# Patient Record
Sex: MALE | Race: WHITE | NOT HISPANIC OR LATINO | Employment: FULL TIME | URBAN - METROPOLITAN AREA
[De-identification: names, ages, dates, MRNs, and addresses within clinical notes are randomized per-mention and may not be internally consistent; named-entity substitution may affect disease eponyms.]

---

## 2019-03-29 ENCOUNTER — OFFICE VISIT CONVERTED (OUTPATIENT)
Dept: SURGERY | Facility: CLINIC | Age: 44
End: 2019-03-29
Attending: UROLOGY

## 2021-05-15 VITALS — BODY MASS INDEX: 25.91 KG/M2 | WEIGHT: 181 LBS | RESPIRATION RATE: 16 BRPM | HEIGHT: 70 IN

## 2023-03-21 ENCOUNTER — HOSPITAL ENCOUNTER (INPATIENT)
Facility: HOSPITAL | Age: 48
LOS: 1 days | Discharge: HOME | DRG: 247 | End: 2023-03-23
Attending: EMERGENCY MEDICINE | Admitting: INTERNAL MEDICINE
Payer: COMMERCIAL

## 2023-03-21 ENCOUNTER — APPOINTMENT (OUTPATIENT)
Dept: GENERAL RADIOLOGY | Facility: HOSPITAL | Age: 48
DRG: 247 | End: 2023-03-21
Payer: COMMERCIAL

## 2023-03-21 DIAGNOSIS — I21.4 NSTEMI (NON-ST ELEVATED MYOCARDIAL INFARCTION): Primary | ICD-10-CM

## 2023-03-21 LAB
ALBUMIN SERPL-MCNC: 4.2 G/DL (ref 3.5–5.2)
ALBUMIN/GLOB SERPL: 2 G/DL
ALP SERPL-CCNC: 48 U/L (ref 39–117)
ALT SERPL W P-5'-P-CCNC: 24 U/L (ref 1–41)
ANION GAP SERPL CALCULATED.3IONS-SCNC: 11 MMOL/L (ref 5–15)
APTT PPP: 26.5 SECONDS (ref 78–95.9)
AST SERPL-CCNC: 22 U/L (ref 1–40)
BASOPHILS # BLD AUTO: 0.01 10*3/MM3 (ref 0–0.2)
BASOPHILS # BLD AUTO: 0.03 10*3/MM3 (ref 0–0.2)
BASOPHILS NFR BLD AUTO: 0.1 % (ref 0–1.5)
BASOPHILS NFR BLD AUTO: 0.3 % (ref 0–1.5)
BILIRUB SERPL-MCNC: 0.3 MG/DL (ref 0–1.2)
BUN SERPL-MCNC: 25 MG/DL (ref 6–20)
BUN/CREAT SERPL: 26.9 (ref 7–25)
CALCIUM SPEC-SCNC: 9.1 MG/DL (ref 8.6–10.5)
CHLORIDE SERPL-SCNC: 101 MMOL/L (ref 98–107)
CO2 SERPL-SCNC: 23 MMOL/L (ref 22–29)
CREAT SERPL-MCNC: 0.93 MG/DL (ref 0.76–1.27)
DEPRECATED RDW RBC AUTO: 40.9 FL (ref 37–54)
DEPRECATED RDW RBC AUTO: 41.5 FL (ref 37–54)
EGFRCR SERPLBLD CKD-EPI 2021: 102.6 ML/MIN/1.73
EOSINOPHIL # BLD AUTO: 0 10*3/MM3 (ref 0–0.4)
EOSINOPHIL # BLD AUTO: 0.01 10*3/MM3 (ref 0–0.4)
EOSINOPHIL NFR BLD AUTO: 0 % (ref 0.3–6.2)
EOSINOPHIL NFR BLD AUTO: 0.1 % (ref 0.3–6.2)
ERYTHROCYTE [DISTWIDTH] IN BLOOD BY AUTOMATED COUNT: 12.4 % (ref 12.3–15.4)
ERYTHROCYTE [DISTWIDTH] IN BLOOD BY AUTOMATED COUNT: 12.6 % (ref 12.3–15.4)
GEN 5 2HR TROPONIN T REFLEX: 130 NG/L
GLOBULIN UR ELPH-MCNC: 2.1 GM/DL
GLUCOSE SERPL-MCNC: 124 MG/DL (ref 65–99)
HCT VFR BLD AUTO: 40.4 % (ref 37.5–51)
HCT VFR BLD AUTO: 40.8 % (ref 37.5–51)
HGB BLD-MCNC: 13.8 G/DL (ref 13–17.7)
HGB BLD-MCNC: 14 G/DL (ref 13–17.7)
HOLD SPECIMEN: NORMAL
HOLD SPECIMEN: NORMAL
IMM GRANULOCYTES # BLD AUTO: 0.03 10*3/MM3 (ref 0–0.05)
IMM GRANULOCYTES # BLD AUTO: 0.03 10*3/MM3 (ref 0–0.05)
IMM GRANULOCYTES NFR BLD AUTO: 0.3 % (ref 0–0.5)
IMM GRANULOCYTES NFR BLD AUTO: 0.3 % (ref 0–0.5)
INR PPP: 1.04 (ref 0.86–1.15)
LIPASE SERPL-CCNC: 28 U/L (ref 13–60)
LYMPHOCYTES # BLD AUTO: 1.27 10*3/MM3 (ref 0.7–3.1)
LYMPHOCYTES # BLD AUTO: 1.28 10*3/MM3 (ref 0.7–3.1)
LYMPHOCYTES NFR BLD AUTO: 12 % (ref 19.6–45.3)
LYMPHOCYTES NFR BLD AUTO: 14.4 % (ref 19.6–45.3)
MAGNESIUM SERPL-MCNC: 1.8 MG/DL (ref 1.6–2.6)
MCH RBC QN AUTO: 30.7 PG (ref 26.6–33)
MCH RBC QN AUTO: 30.8 PG (ref 26.6–33)
MCHC RBC AUTO-ENTMCNC: 34.2 G/DL (ref 31.5–35.7)
MCHC RBC AUTO-ENTMCNC: 34.3 G/DL (ref 31.5–35.7)
MCV RBC AUTO: 89.5 FL (ref 79–97)
MCV RBC AUTO: 90.2 FL (ref 79–97)
MONOCYTES # BLD AUTO: 0.32 10*3/MM3 (ref 0.1–0.9)
MONOCYTES # BLD AUTO: 0.46 10*3/MM3 (ref 0.1–0.9)
MONOCYTES NFR BLD AUTO: 3.6 % (ref 5–12)
MONOCYTES NFR BLD AUTO: 4.4 % (ref 5–12)
NEUTROPHILS NFR BLD AUTO: 7.24 10*3/MM3 (ref 1.7–7)
NEUTROPHILS NFR BLD AUTO: 8.78 10*3/MM3 (ref 1.7–7)
NEUTROPHILS NFR BLD AUTO: 81.5 % (ref 42.7–76)
NEUTROPHILS NFR BLD AUTO: 83 % (ref 42.7–76)
NRBC BLD AUTO-RTO: 0 /100 WBC (ref 0–0.2)
NRBC BLD AUTO-RTO: 0 /100 WBC (ref 0–0.2)
NT-PROBNP SERPL-MCNC: 45.7 PG/ML (ref 0–450)
PLATELET # BLD AUTO: 225 10*3/MM3 (ref 140–450)
PLATELET # BLD AUTO: 239 10*3/MM3 (ref 140–450)
PMV BLD AUTO: 9.4 FL (ref 6–12)
PMV BLD AUTO: 9.5 FL (ref 6–12)
POTASSIUM SERPL-SCNC: 4.5 MMOL/L (ref 3.5–5.2)
PROT SERPL-MCNC: 6.3 G/DL (ref 6–8.5)
PROTHROMBIN TIME: 13.7 SECONDS (ref 11.8–14.9)
RBC # BLD AUTO: 4.48 10*6/MM3 (ref 4.14–5.8)
RBC # BLD AUTO: 4.56 10*6/MM3 (ref 4.14–5.8)
SODIUM SERPL-SCNC: 135 MMOL/L (ref 136–145)
TROPONIN T DELTA: 36 NG/L
TROPONIN T SERPL HS-MCNC: 94 NG/L
WBC NRBC COR # BLD: 10.57 10*3/MM3 (ref 3.4–10.8)
WBC NRBC COR # BLD: 8.89 10*3/MM3 (ref 3.4–10.8)
WHOLE BLOOD HOLD COAG: NORMAL
WHOLE BLOOD HOLD SPECIMEN: NORMAL

## 2023-03-21 PROCEDURE — 99223 1ST HOSP IP/OBS HIGH 75: CPT | Performed by: STUDENT IN AN ORGANIZED HEALTH CARE EDUCATION/TRAINING PROGRAM

## 2023-03-21 PROCEDURE — 93005 ELECTROCARDIOGRAM TRACING: CPT | Performed by: EMERGENCY MEDICINE

## 2023-03-21 PROCEDURE — 85025 COMPLETE CBC W/AUTO DIFF WBC: CPT | Performed by: EMERGENCY MEDICINE

## 2023-03-21 PROCEDURE — 99285 EMERGENCY DEPT VISIT HI MDM: CPT

## 2023-03-21 PROCEDURE — 83880 ASSAY OF NATRIURETIC PEPTIDE: CPT

## 2023-03-21 PROCEDURE — 71045 X-RAY EXAM CHEST 1 VIEW: CPT

## 2023-03-21 PROCEDURE — 83735 ASSAY OF MAGNESIUM: CPT

## 2023-03-21 PROCEDURE — 85730 THROMBOPLASTIN TIME PARTIAL: CPT | Performed by: EMERGENCY MEDICINE

## 2023-03-21 PROCEDURE — 25010000002 HEPARIN (PORCINE) PER 1000 UNITS: Performed by: EMERGENCY MEDICINE

## 2023-03-21 PROCEDURE — 85610 PROTHROMBIN TIME: CPT | Performed by: EMERGENCY MEDICINE

## 2023-03-21 PROCEDURE — 80053 COMPREHEN METABOLIC PANEL: CPT

## 2023-03-21 PROCEDURE — 93005 ELECTROCARDIOGRAM TRACING: CPT

## 2023-03-21 PROCEDURE — 85025 COMPLETE CBC W/AUTO DIFF WBC: CPT

## 2023-03-21 PROCEDURE — 25010000002 HEPARIN (PORCINE) PER 1000 UNITS: Performed by: INTERNAL MEDICINE

## 2023-03-21 PROCEDURE — 84484 ASSAY OF TROPONIN QUANT: CPT

## 2023-03-21 PROCEDURE — 83690 ASSAY OF LIPASE: CPT

## 2023-03-21 RX ORDER — ASPIRIN 81 MG/1
324 TABLET, CHEWABLE ORAL ONCE
Status: DISCONTINUED | OUTPATIENT
Start: 2023-03-21 | End: 2023-03-23

## 2023-03-21 RX ORDER — HEPARIN SOD,PORCINE/0.9 % NACL 25000/250
12 INTRAVENOUS SOLUTION INTRAVENOUS
Status: DISCONTINUED | OUTPATIENT
Start: 2023-03-21 | End: 2023-03-23 | Stop reason: HOSPADM

## 2023-03-21 RX ORDER — SODIUM CHLORIDE 0.9 % (FLUSH) 0.9 %
10 SYRINGE (ML) INJECTION AS NEEDED
Status: DISCONTINUED | OUTPATIENT
Start: 2023-03-21 | End: 2023-03-23 | Stop reason: HOSPADM

## 2023-03-21 RX ORDER — LIDOCAINE HYDROCHLORIDE 20 MG/ML
15 SOLUTION OROPHARYNGEAL ONCE
Status: DISCONTINUED | OUTPATIENT
Start: 2023-03-21 | End: 2023-03-21

## 2023-03-21 RX ORDER — ALUMINA, MAGNESIA, AND SIMETHICONE 2400; 2400; 240 MG/30ML; MG/30ML; MG/30ML
15 SUSPENSION ORAL ONCE
Status: DISCONTINUED | OUTPATIENT
Start: 2023-03-21 | End: 2023-03-21

## 2023-03-21 RX ADMIN — HEPARIN SODIUM 12 UNITS/KG/HR: 5000 INJECTION INTRAVENOUS; SUBCUTANEOUS at 22:44

## 2023-03-21 RX ADMIN — NITROGLYCERIN 0.5 INCH: 20 OINTMENT TOPICAL at 23:27

## 2023-03-22 ENCOUNTER — APPOINTMENT (OUTPATIENT)
Dept: CARDIOLOGY | Facility: HOSPITAL | Age: 48
DRG: 247 | End: 2023-03-22
Payer: COMMERCIAL

## 2023-03-22 PROBLEM — Z51.81 THERAPEUTIC DRUG MONITORING: Status: ACTIVE | Noted: 2023-03-22

## 2023-03-22 PROBLEM — R00.1 SINUS BRADYCARDIA: Status: ACTIVE | Noted: 2023-03-22

## 2023-03-22 PROBLEM — Z98.890 STATUS POST LEFT HEART CATHETERIZATION: Status: ACTIVE | Noted: 2023-03-22

## 2023-03-22 PROBLEM — I21.4 NSTEMI (NON-ST ELEVATED MYOCARDIAL INFARCTION): Status: RESOLVED | Noted: 2023-03-21 | Resolved: 2023-03-22

## 2023-03-22 LAB
ACT BLD: 215 SECONDS (ref 89–137)
ACT BLD: 275 SECONDS (ref 89–137)
APTT PPP: 46.6 SECONDS (ref 78–95.9)
APTT PPP: 71.7 SECONDS (ref 78–95.9)
BH CV ECHO MEAS - AO ROOT DIAM: 3.1 CM
BH CV ECHO MEAS - EDV(MOD-SP2): 59 ML
BH CV ECHO MEAS - EDV(MOD-SP4): 58 ML
BH CV ECHO MEAS - EF(MOD-BP): 62 %
BH CV ECHO MEAS - EF(MOD-SP2): 62 %
BH CV ECHO MEAS - EF(MOD-SP4): 61 %
BH CV ECHO MEAS - ESV(MOD-SP2): 23 ML
BH CV ECHO MEAS - ESV(MOD-SP4): 22 ML
BH CV ECHO MEAS - IVSD: 0.9 CM
BH CV ECHO MEAS - LA DIMENSION: 2.9 CM
BH CV ECHO MEAS - LAT PEAK E' VEL: 5.6 CM/SEC
BH CV ECHO MEAS - LVIDD: 3.9 CM
BH CV ECHO MEAS - LVIDS: 2.6 CM
BH CV ECHO MEAS - LVOT DIAM: 1.8 CM
BH CV ECHO MEAS - LVPWD: 1.1 CM
BH CV ECHO MEAS - MED PEAK E' VEL: 5.7 CM/SEC
BH CV ECHO MEAS - MV A MAX VEL: 75 CM/SEC
BH CV ECHO MEAS - MV DEC TIME: 90 MSEC
BH CV ECHO MEAS - MV E MAX VEL: 90 CM/SEC
BH CV ECHO MEAS - MV E/A: 1.2
BH CV ECHO MEAS - RAP SYSTOLE: 23 MMHG
BH CV ECHO MEAS - RVDD: 2.9 CM
BH CV ECHO MEAS - RVSP: 3 MMHG
BH CV ECHO MEAS - TR MAX PG: 20 MMHG
BH CV ECHO MEAS - TR MAX VEL: 222 CM/SEC
BH CV ECHO MEASUREMENTS AVERAGE E/E' RATIO: 15.93
CHOLEST SERPL-MCNC: 228 MG/DL (ref 0–200)
HBA1C MFR BLD: 5.1 % (ref 4.8–5.6)
HDLC SERPL-MCNC: 49 MG/DL (ref 40–60)
HOLD SPECIMEN: NORMAL
IVRT: 92 MSEC
LDLC SERPL CALC-MCNC: 163 MG/DL (ref 0–100)
LDLC/HDLC SERPL: 3.29 {RATIO}
LEFT ATRIUM VOLUME INDEX: 12 ML/M2
LEFT ATRIUM VOLUME: 23.1 ML
MAXIMAL PREDICTED HEART RATE: 174 BPM
STRESS TARGET HR: 148 BPM
TRIGL SERPL-MCNC: 88 MG/DL (ref 0–150)
VLDLC SERPL-MCNC: 16 MG/DL (ref 5–40)
WHOLE BLOOD HOLD SPECIMEN: NORMAL

## 2023-03-22 PROCEDURE — C9600 PERC DRUG-EL COR STENT SING: HCPCS | Performed by: INTERNAL MEDICINE

## 2023-03-22 PROCEDURE — 94799 UNLISTED PULMONARY SVC/PX: CPT

## 2023-03-22 PROCEDURE — C1725 CATH, TRANSLUMIN NON-LASER: HCPCS | Performed by: INTERNAL MEDICINE

## 2023-03-22 PROCEDURE — 25010000002 HEPARIN (PORCINE) PER 1000 UNITS: Performed by: INTERNAL MEDICINE

## 2023-03-22 PROCEDURE — 83036 HEMOGLOBIN GLYCOSYLATED A1C: CPT | Performed by: INTERNAL MEDICINE

## 2023-03-22 PROCEDURE — C1887 CATHETER, GUIDING: HCPCS | Performed by: INTERNAL MEDICINE

## 2023-03-22 PROCEDURE — C1874 STENT, COATED/COV W/DEL SYS: HCPCS | Performed by: INTERNAL MEDICINE

## 2023-03-22 PROCEDURE — 25510000001 IOPAMIDOL PER 1 ML: Performed by: INTERNAL MEDICINE

## 2023-03-22 PROCEDURE — 25010000002 FENTANYL CITRATE (PF) 50 MCG/ML SOLUTION: Performed by: INTERNAL MEDICINE

## 2023-03-22 PROCEDURE — 93005 ELECTROCARDIOGRAM TRACING: CPT | Performed by: INTERNAL MEDICINE

## 2023-03-22 PROCEDURE — 99152 MOD SED SAME PHYS/QHP 5/>YRS: CPT | Performed by: INTERNAL MEDICINE

## 2023-03-22 PROCEDURE — 93306 TTE W/DOPPLER COMPLETE: CPT

## 2023-03-22 PROCEDURE — 92978 ENDOLUMINL IVUS OCT C 1ST: CPT | Performed by: INTERNAL MEDICINE

## 2023-03-22 PROCEDURE — 027034Z DILATION OF CORONARY ARTERY, ONE ARTERY WITH DRUG-ELUTING INTRALUMINAL DEVICE, PERCUTANEOUS APPROACH: ICD-10-PCS | Performed by: INTERNAL MEDICINE

## 2023-03-22 PROCEDURE — 80061 LIPID PANEL: CPT | Performed by: INTERNAL MEDICINE

## 2023-03-22 PROCEDURE — B240ZZ3 ULTRASONOGRAPHY OF SINGLE CORONARY ARTERY, INTRAVASCULAR: ICD-10-PCS | Performed by: INTERNAL MEDICINE

## 2023-03-22 PROCEDURE — 93005 ELECTROCARDIOGRAM TRACING: CPT | Performed by: STUDENT IN AN ORGANIZED HEALTH CARE EDUCATION/TRAINING PROGRAM

## 2023-03-22 PROCEDURE — 99233 SBSQ HOSP IP/OBS HIGH 50: CPT | Performed by: INTERNAL MEDICINE

## 2023-03-22 PROCEDURE — 93458 L HRT ARTERY/VENTRICLE ANGIO: CPT | Performed by: INTERNAL MEDICINE

## 2023-03-22 PROCEDURE — 99153 MOD SED SAME PHYS/QHP EA: CPT | Performed by: INTERNAL MEDICINE

## 2023-03-22 PROCEDURE — 93306 TTE W/DOPPLER COMPLETE: CPT | Performed by: INTERNAL MEDICINE

## 2023-03-22 PROCEDURE — 85730 THROMBOPLASTIN TIME PARTIAL: CPT | Performed by: STUDENT IN AN ORGANIZED HEALTH CARE EDUCATION/TRAINING PROGRAM

## 2023-03-22 PROCEDURE — 4A023N7 MEASUREMENT OF CARDIAC SAMPLING AND PRESSURE, LEFT HEART, PERCUTANEOUS APPROACH: ICD-10-PCS | Performed by: INTERNAL MEDICINE

## 2023-03-22 PROCEDURE — C1894 INTRO/SHEATH, NON-LASER: HCPCS | Performed by: INTERNAL MEDICINE

## 2023-03-22 PROCEDURE — C1769 GUIDE WIRE: HCPCS | Performed by: INTERNAL MEDICINE

## 2023-03-22 PROCEDURE — 25010000002 HEPARIN (PORCINE) PER 1000 UNITS: Performed by: STUDENT IN AN ORGANIZED HEALTH CARE EDUCATION/TRAINING PROGRAM

## 2023-03-22 PROCEDURE — C1753 CATH, INTRAVAS ULTRASOUND: HCPCS | Performed by: INTERNAL MEDICINE

## 2023-03-22 PROCEDURE — 99222 1ST HOSP IP/OBS MODERATE 55: CPT | Performed by: INTERNAL MEDICINE

## 2023-03-22 PROCEDURE — B2151ZZ FLUOROSCOPY OF LEFT HEART USING LOW OSMOLAR CONTRAST: ICD-10-PCS | Performed by: INTERNAL MEDICINE

## 2023-03-22 PROCEDURE — 25010000002 MIDAZOLAM PER 1 MG: Performed by: INTERNAL MEDICINE

## 2023-03-22 PROCEDURE — B2111ZZ FLUOROSCOPY OF MULTIPLE CORONARY ARTERIES USING LOW OSMOLAR CONTRAST: ICD-10-PCS | Performed by: INTERNAL MEDICINE

## 2023-03-22 PROCEDURE — 85347 COAGULATION TIME ACTIVATED: CPT

## 2023-03-22 PROCEDURE — 92928 PRQ TCAT PLMT NTRAC ST 1 LES: CPT | Performed by: INTERNAL MEDICINE

## 2023-03-22 DEVICE — XIENCE SKYPOINT™ EVEROLIMUS ELUTING CORONARY STENT SYSTEM 3.00 MM X 38 MM / RAPID-EXCHANGE
Type: IMPLANTABLE DEVICE | Status: FUNCTIONAL
Brand: XIENCE SKYPOINT™

## 2023-03-22 RX ORDER — ONDANSETRON 2 MG/ML
4 INJECTION INTRAMUSCULAR; INTRAVENOUS EVERY 6 HOURS PRN
Status: DISCONTINUED | OUTPATIENT
Start: 2023-03-22 | End: 2023-03-23 | Stop reason: HOSPADM

## 2023-03-22 RX ORDER — VERAPAMIL HYDROCHLORIDE 2.5 MG/ML
INJECTION, SOLUTION INTRAVENOUS
Status: DISCONTINUED | OUTPATIENT
Start: 2023-03-22 | End: 2023-03-22 | Stop reason: HOSPADM

## 2023-03-22 RX ORDER — FENTANYL CITRATE 50 UG/ML
INJECTION, SOLUTION INTRAMUSCULAR; INTRAVENOUS
Status: DISCONTINUED | OUTPATIENT
Start: 2023-03-22 | End: 2023-03-22 | Stop reason: HOSPADM

## 2023-03-22 RX ORDER — SODIUM CHLORIDE 9 MG/ML
75 INJECTION, SOLUTION INTRAVENOUS CONTINUOUS
Status: DISCONTINUED | OUTPATIENT
Start: 2023-03-22 | End: 2023-03-22

## 2023-03-22 RX ORDER — SODIUM CHLORIDE 0.9 % (FLUSH) 0.9 %
10 SYRINGE (ML) INJECTION EVERY 12 HOURS SCHEDULED
Status: DISCONTINUED | OUTPATIENT
Start: 2023-03-22 | End: 2023-03-23 | Stop reason: HOSPADM

## 2023-03-22 RX ORDER — SODIUM CHLORIDE 9 MG/ML
40 INJECTION, SOLUTION INTRAVENOUS AS NEEDED
Status: DISCONTINUED | OUTPATIENT
Start: 2023-03-22 | End: 2023-03-23 | Stop reason: HOSPADM

## 2023-03-22 RX ORDER — SODIUM CHLORIDE 0.9 % (FLUSH) 0.9 %
10 SYRINGE (ML) INJECTION AS NEEDED
Status: DISCONTINUED | OUTPATIENT
Start: 2023-03-22 | End: 2023-03-23 | Stop reason: HOSPADM

## 2023-03-22 RX ORDER — SODIUM CHLORIDE, SODIUM LACTATE, POTASSIUM CHLORIDE, CALCIUM CHLORIDE 600; 310; 30; 20 MG/100ML; MG/100ML; MG/100ML; MG/100ML
75 INJECTION, SOLUTION INTRAVENOUS CONTINUOUS
Status: ACTIVE | OUTPATIENT
Start: 2023-03-22 | End: 2023-03-23

## 2023-03-22 RX ORDER — ASPIRIN 81 MG/1
TABLET, CHEWABLE ORAL
Status: DISCONTINUED | OUTPATIENT
Start: 2023-03-22 | End: 2023-03-22 | Stop reason: HOSPADM

## 2023-03-22 RX ORDER — DIAPER,BRIEF,INFANT-TODD,DISP
1 EACH MISCELLANEOUS ONCE
Status: COMPLETED | OUTPATIENT
Start: 2023-03-22 | End: 2023-03-22

## 2023-03-22 RX ORDER — HEPARIN SODIUM 1000 [USP'U]/ML
INJECTION, SOLUTION INTRAVENOUS; SUBCUTANEOUS
Status: DISCONTINUED | OUTPATIENT
Start: 2023-03-22 | End: 2023-03-22 | Stop reason: HOSPADM

## 2023-03-22 RX ORDER — ONDANSETRON 4 MG/1
4 TABLET, FILM COATED ORAL EVERY 6 HOURS PRN
Status: DISCONTINUED | OUTPATIENT
Start: 2023-03-22 | End: 2023-03-23 | Stop reason: HOSPADM

## 2023-03-22 RX ORDER — ASPIRIN 81 MG/1
81 TABLET, CHEWABLE ORAL DAILY
Status: DISCONTINUED | OUTPATIENT
Start: 2023-03-23 | End: 2023-03-23 | Stop reason: HOSPADM

## 2023-03-22 RX ORDER — ASPIRIN 81 MG/1
81 TABLET ORAL DAILY
Status: DISCONTINUED | OUTPATIENT
Start: 2023-03-22 | End: 2023-03-22

## 2023-03-22 RX ORDER — ROSUVASTATIN CALCIUM 20 MG/1
20 TABLET, COATED ORAL NIGHTLY
Status: DISCONTINUED | OUTPATIENT
Start: 2023-03-22 | End: 2023-03-23 | Stop reason: HOSPADM

## 2023-03-22 RX ORDER — ACETAMINOPHEN 325 MG/1
650 TABLET ORAL EVERY 4 HOURS PRN
Status: DISCONTINUED | OUTPATIENT
Start: 2023-03-22 | End: 2023-03-23 | Stop reason: HOSPADM

## 2023-03-22 RX ORDER — MIDAZOLAM HYDROCHLORIDE 1 MG/ML
INJECTION INTRAMUSCULAR; INTRAVENOUS
Status: DISCONTINUED | OUTPATIENT
Start: 2023-03-22 | End: 2023-03-22 | Stop reason: HOSPADM

## 2023-03-22 RX ORDER — SODIUM CHLORIDE 9 MG/ML
100 INJECTION, SOLUTION INTRAVENOUS CONTINUOUS
Status: DISCONTINUED | OUTPATIENT
Start: 2023-03-22 | End: 2023-03-22

## 2023-03-22 RX ORDER — LIDOCAINE HYDROCHLORIDE 20 MG/ML
INJECTION, SOLUTION INFILTRATION; PERINEURAL
Status: DISCONTINUED | OUTPATIENT
Start: 2023-03-22 | End: 2023-03-22 | Stop reason: HOSPADM

## 2023-03-22 RX ORDER — NITROGLYCERIN 0.4 MG/1
0.4 TABLET SUBLINGUAL
Status: DISCONTINUED | OUTPATIENT
Start: 2023-03-22 | End: 2023-03-23 | Stop reason: HOSPADM

## 2023-03-22 RX ORDER — ACETAMINOPHEN 325 MG/1
650 TABLET ORAL EVERY 4 HOURS PRN
Status: DISCONTINUED | OUTPATIENT
Start: 2023-03-22 | End: 2023-03-22 | Stop reason: SDUPTHER

## 2023-03-22 RX ADMIN — Medication 10 ML: at 01:02

## 2023-03-22 RX ADMIN — METOPROLOL TARTRATE 25 MG: 25 TABLET, FILM COATED ORAL at 08:51

## 2023-03-22 RX ADMIN — ROSUVASTATIN 20 MG: 20 TABLET, FILM COATED ORAL at 20:47

## 2023-03-22 RX ADMIN — ROSUVASTATIN 20 MG: 20 TABLET, FILM COATED ORAL at 01:02

## 2023-03-22 RX ADMIN — SODIUM CHLORIDE, POTASSIUM CHLORIDE, SODIUM LACTATE AND CALCIUM CHLORIDE 75 ML/HR: 600; 310; 30; 20 INJECTION, SOLUTION INTRAVENOUS at 01:02

## 2023-03-22 RX ADMIN — HEPARIN SODIUM 15 UNITS/KG/HR: 5000 INJECTION INTRAVENOUS; SUBCUTANEOUS at 10:50

## 2023-03-22 RX ADMIN — SODIUM CHLORIDE 100 ML/HR: 9 INJECTION, SOLUTION INTRAVENOUS at 16:50

## 2023-03-22 RX ADMIN — METOPROLOL TARTRATE 25 MG: 25 TABLET, FILM COATED ORAL at 01:02

## 2023-03-22 RX ADMIN — BACITRACIN 0.9 G: 500 OINTMENT TOPICAL at 20:45

## 2023-03-22 RX ADMIN — Medication 10 ML: at 20:48

## 2023-03-22 RX ADMIN — ASPIRIN 81 MG: 81 TABLET, COATED ORAL at 08:51

## 2023-03-22 NOTE — ED NOTES
Patient verbalizes understanding bleeding risks with heparin administration, and to not get up from bed; call for help if he needs anything due to bleeding risks.

## 2023-03-22 NOTE — ED PROVIDER NOTES
Time: 9:56 PM EDT  Date of encounter:  3/21/2023  Independent Historian/Clinical History and Information was obtained by:   Patient  Chief Complaint: chest pain    History is limited by: N/A    History of Present Illness:  Patient is a 48 y.o. year old male who presents to the emergency department for evaluation of chest pain. Patient reports he has been experiencing intermittent L sided chest pain that feels like pressure for the past two years that typically resolves on its own. He reports he started experiencing an episode today while doing a cycling class. He notes he had an episode of pain a few days ago also while working out. He reports the pain radiated to his jaw and L arm then which was new, and he also reports associated nausea. He states these have resolved now. He also reports elevated heart rate and gas during this episode. He reports he took gas-x with some relief. He reports minimal pain now that he rates a 1-2/10. [facility]    Patient denies hx of cardiac issues. He reports extensive family hx significant for CAD. He also reports hx of hyperlipidemia that is not treated with medication. He denies any other medical hx.    [facility]    Patient Care Team  Primary Care Provider: Yury Mosley MD    Past Medical History:     No Known Allergies  History reviewed. No pertinent past medical history.  History reviewed. No pertinent surgical history.  History reviewed. No pertinent family history.    Home Medications:  Prior to Admission medications    Not on File        Social History:   Social History     Tobacco Use   • Smoking status: Never     Passive exposure: Never   • Smokeless tobacco: Never   Vaping Use   • Vaping Use: Never used   Substance Use Topics   • Alcohol use: Yes     Alcohol/week: 6.0 standard drinks     Types: 6 Cans of beer per week   • Drug use: Never         Review of Systems:  Review of Systems   Constitutional: Negative for chills and fever.  "  HENT: Negative for congestion, ear pain and sore throat.    Eyes: Negative for pain.   Respiratory: Negative for cough, chest tightness and shortness of breath.    Cardiovascular: Positive for chest pain (L sided, radiation to jaw and L arm).        Positive for elevated heart rate   Gastrointestinal: Positive for nausea. Negative for abdominal pain, diarrhea and vomiting.        Positive for flatulence   Genitourinary: Negative for flank pain and hematuria.   Musculoskeletal: Negative for joint swelling.   Skin: Negative for pallor.   Neurological: Negative for seizures and headaches.   All other systems reviewed and are negative.       Physical Exam:  /59 (BP Location: Right arm, Patient Position: Sitting)   Pulse 69   Temp 98.6 °F (37 °C) (Oral)   Resp 18   Ht 175.3 cm (69\")   Wt 82.5 kg (181 lb 14.1 oz)   SpO2 92%   BMI 26.86 kg/m²     Physical Exam  Vitals and nursing note reviewed.   Constitutional:       General: He is not in acute distress.     Appearance: Normal appearance. He is not toxic-appearing.   HENT:      Head: Normocephalic and atraumatic.      Nose: Nose normal.      Mouth/Throat:      Mouth: Mucous membranes are moist.   Eyes:      General: No scleral icterus.     Extraocular Movements: Extraocular movements intact.      Conjunctiva/sclera: Conjunctivae normal.      Pupils: Pupils are equal, round, and reactive to light.   Cardiovascular:      Rate and Rhythm: Normal rate and regular rhythm.      Pulses: Normal pulses.      Heart sounds: Normal heart sounds.   Pulmonary:      Effort: Pulmonary effort is normal. No respiratory distress.      Breath sounds: Normal breath sounds.   Abdominal:      General: Abdomen is flat. There is no distension.      Palpations: Abdomen is soft.      Tenderness: There is no abdominal tenderness.   Musculoskeletal:         General: Normal range of motion.      Cervical back: Normal range of motion and neck supple.   Skin:     General: Skin is warm and " dry.      Capillary Refill: Capillary refill takes less than 2 seconds.   Neurological:      General: No focal deficit present.      Mental Status: He is alert and oriented to person, place, and time. Mental status is at baseline.   Psychiatric:         Mood and Affect: Mood normal.         Behavior: Behavior normal.                  Procedures:  Procedures      Medical Decision Making:      Comorbidities that affect care:    Hyperlipidemia    External Notes reviewed:    Previous Clinic Note: Patient seen at urgent care for chest pain and send to the ED for further evaluation      The following orders were placed and all results were independently analyzed by me:  Orders Placed This Encounter   Procedures   • XR Chest 1 View   • [place] Draw   • High Sensitivity Troponin T   • Comprehensive Metabolic Panel   • Lipase   • BNP   • Magnesium   • CBC Auto Differential   • High Sensitivity Troponin T 2Hr   • Protime-INR   • aPTT   • aPTT   • CBC Auto Differential   • aPTT   • NPO Diet NPO Type: Strict NPO   • Undress & Gown   • Continuous Pulse Oximetry   • Notify Provider Platelet Count Less Than 29030   • Notify Provider if PTT Not in Therapeutic Range After 24 Hours   • Stop Infusion & Notify Provider if Bleeding Occurs   • RN To Release aPTT Order 6 Hours After Heparin Bolus & 6 Hours After Any Heparin Rate Change   • After 2 Consecutive Therapeutic aPTTs, Obtain aPTT Daily.  If a Rate Adjustment is Necessary, Resume Every 6 Hour aPTT Draws   • Vital Signs Every 15 Minutes Until Stable, Then Every 4 Hours   • Cardiac Monitoring   • Notify Physician For Unrelieved Chest Pain   • Intake & Output   • Weigh Patient   • Saline Lock & Maintain IV Access   • Code Status and Medical Interventions:   • Inpatient Hospitalist Consult   • Cardiac Rehab Evaluation and Enrollment   • Inpatient Cardiology Consult   • Inpatient Cardiology Consult   • Oxygen Therapy- Nasal Cannula; Titrate for SPO2: 90% - 95%   • ECG 12 Lead ED  Triage Standing Order; Chest Pain   • ECG 12 Lead ED Triage Standing Order; Chest Pain   • ECG 12 Lead Chest Pain   • ECG 12 Lead Chest Pain   • Adult Transthoracic Echo Complete W/ Cont if Necessary Per Protocol   • Insert Peripheral IV   • Insert Peripheral IV   • Inpatient Admission   • CBC & Differential   • Green Top (Gel)   • Lavender Top   • Gold Top - SST   • Light Blue Top   • CBC & Differential       Medications Given in the Emergency Department:  Medications   sodium chloride 0.9 % flush 10 mL (10 mL Intravenous Given 3/22/23 0102)   aspirin chewable tablet 324 mg (324 mg Oral Not Given 3/21/23 1928)   heparin 64270 units/250 mL (100 units/mL) in 0.9% NaCl infusion (12 Units/kg/hr × 82.5 kg Intravenous New Bag 3/21/23 2244)   heparin bolus from bag 4,000 Units (has no administration in time range)   heparin bolus from bag 2,000 Units (has no administration in time range)   sodium chloride 0.9 % flush 10 mL (has no administration in time range)   sodium chloride 0.9 % flush 10 mL (10 mL Intravenous Not Given 3/22/23 0109)   sodium chloride 0.9 % infusion 40 mL (has no administration in time range)   aspirin EC tablet 81 mg (has no administration in time range)   metoprolol tartrate (LOPRESSOR) tablet 25 mg (25 mg Oral Given 3/22/23 0102)   rosuvastatin (CRESTOR) tablet 20 mg (20 mg Oral Given 3/22/23 0102)   acetaminophen (TYLENOL) tablet 650 mg (has no administration in time range)   lactated ringers infusion (75 mL/hr Intravenous New Bag 3/22/23 0102)   nitroglycerin (NITROSTAT) SL tablet 0.4 mg (has no administration in time range)   heparin bolus from bag 5,000 Units (5,000 Units Intravenous Bolus from Bag 3/21/23 2249)   nitroglycerin (NITROSTAT) ointment 0.5 inch (0.5 inches Topical Given 3/21/23 2327)        ED Course:    ED Course as of 03/22/23 0119   Tue Mar 21, 2023   2224 ECG 12 Lead ED Triage Standing Order; Chest Pain  Sinus bradycardia with a rate of 43.  Normal TX and QTc interval.  No  acute ST elevation or depression.  EKG interpreted by me. [LD]   2236 ECG 12 Lead ED Triage Standing Order; Chest Pain  Sinus rhythm with rate of 62.  No acute ST elevation or depression.  Normal OK and QTc interval.  No significant change when compared to previous.  EKG interpreted by me. [LD]      ED Course User Index  [LD] Antony Tong MD       Labs:    Lab Results (last 24 hours)     Procedure Component Value Units Date/Time    High Sensitivity Troponin T [[id number]]  (Abnormal) Collected: 03/21/23 1842    Specimen: Blood Updated: 03/21/23 1924     HS Troponin T 94 ng/L     Narrative:      High Sensitive Troponin T Reference Range:  <10.0 ng/L- Negative Female for AMI  <15.0 ng/L- Negative Male for AMI  >=10 - Abnormal Female indicating possible myocardial injury.  >=15 - Abnormal Male indicating possible myocardial injury.   Clinicians would have to utilize clinical acumen, EKG, Troponin, and serial changes to determine if it is an Acute Myocardial Infarction or myocardial injury due to an underlying chronic condition.         CBC & Differential [[id number]]  (Abnormal) Collected: 03/21/23 1842    Specimen: Blood Updated: 03/21/23 1851    Narrative:      The following orders were created for panel order CBC & Differential.  Procedure                               Abnormality         Status                     ---------                               -----------         ------                     CBC Auto Differential[[id number]]        Abnormal            Final result                 Please view results for these tests on the individual orders.    Comprehensive Metabolic Panel [[id number]]  (Abnormal) Collected: 03/21/23 1842    Specimen: Blood Updated: 03/21/23 1912     Glucose 124 mg/dL      BUN 25 mg/dL      Creatinine 0.93 mg/dL      Sodium 135 mmol/L      Potassium 4.5 mmol/L      Chloride 101 mmol/L      CO2 23.0 mmol/L      Calcium 9.1 mg/dL      Total Protein 6.3 g/dL      Albumin 4.2 g/dL      ALT  (SGPT) 24 U/L      AST (SGOT) 22 U/L      Alkaline Phosphatase 48 U/L      Total Bilirubin 0.3 mg/dL      Globulin 2.1 gm/dL      A/G Ratio 2.0 g/dL      BUN/Creatinine Ratio 26.9     Anion Gap 11.0 mmol/L      eGFR 102.6 mL/min/1.73     Narrative:      GFR Normal >60  Chronic Kidney Disease <60  Kidney Failure <15      Lipase [[id number]]  (Normal) Collected: 03/21/23 1842    Specimen: Blood Updated: 03/21/23 1912     Lipase 28 U/L     BNP [[id number]]  (Normal) Collected: 03/21/23 1842    Specimen: Blood Updated: 03/21/23 1910     proBNP 45.7 pg/mL     Narrative:      Among patients with dyspnea, NT-proBNP is highly sensitive for the detection of acute congestive heart failure. In addition NT-proBNP of <300 pg/ml effectively rules out acute congestive heart failure with 99% negative predictive value.      Magnesium [[id number]]  (Normal) Collected: 03/21/23 1842    Specimen: Blood Updated: 03/21/23 1912     Magnesium 1.8 mg/dL     CBC Auto Differential [[id number]]  (Abnormal) Collected: 03/21/23 1842    Specimen: Blood Updated: 03/21/23 1851     WBC 10.57 10*3/mm3      RBC 4.48 10*6/mm3      Hemoglobin 13.8 g/dL      Hematocrit 40.4 %      MCV 90.2 fL      MCH 30.8 pg      MCHC 34.2 g/dL      RDW 12.4 %      RDW-SD 40.9 fl      MPV 9.5 fL      Platelets 225 10*3/mm3      Neutrophil % 83.0 %      Lymphocyte % 12.0 %      Monocyte % 4.4 %      Eosinophil % 0.0 %      Basophil % 0.3 %      Immature Grans % 0.3 %      Neutrophils, Absolute 8.78 10*3/mm3      Lymphocytes, Absolute 1.27 10*3/mm3      Monocytes, Absolute 0.46 10*3/mm3      Eosinophils, Absolute 0.00 10*3/mm3      Basophils, Absolute 0.03 10*3/mm3      Immature Grans, Absolute 0.03 10*3/mm3      nRBC 0.0 /100 WBC     High Sensitivity Troponin T 2Hr [[id number]]  (Abnormal) Collected: 03/21/23 2056    Specimen: Blood Updated: 03/21/23 2140     HS Troponin T 130 ng/L      Troponin T Delta 36 ng/L     Narrative:      High Sensitive Troponin T Reference  Range:  <10.0 ng/L- Negative Female for AMI  <15.0 ng/L- Negative Male for AMI  >=10 - Abnormal Female indicating possible myocardial injury.  >=15 - Abnormal Male indicating possible myocardial injury.   Clinicians would have to utilize clinical acumen, EKG, Troponin, and serial changes to determine if it is an Acute Myocardial Infarction or myocardial injury due to an underlying chronic condition.         Protime-INR [[id number]]  (Normal) Collected: 03/21/23 2241    Specimen: Blood Updated: 03/21/23 2305     Protime 13.7 Seconds      INR 1.04    Narrative:      Suggested Therapeutic Ranges For Oral Anticoagulant Therapy:  Level of Therapy                      INR Target Range  Standard Dose                            2.0-3.0  High Dose                                2.5-3.5  Patients not receiving anticoagulant  Therapy Normal Range                     0.86-1.15    aPTT [[id number]]  (Abnormal) Collected: 03/21/23 2241    Specimen: Blood Updated: 03/21/23 2305     PTT 26.5 seconds     CBC & Differential [[id number]]  (Abnormal) Collected: 03/21/23 2241    Specimen: Blood Updated: 03/21/23 2254    Narrative:      The following orders were created for panel order CBC & Differential.  Procedure                               Abnormality         Status                     ---------                               -----------         ------                     CBC Auto Differential[[id number]]        Abnormal            Final result                 Please view results for these tests on the individual orders.    CBC Auto Differential [[id number]]  (Abnormal) Collected: 03/21/23 2241    Specimen: Blood Updated: 03/21/23 2254     WBC 8.89 10*3/mm3      RBC 4.56 10*6/mm3      Hemoglobin 14.0 g/dL      Hematocrit 40.8 %      MCV 89.5 fL      MCH 30.7 pg      MCHC 34.3 g/dL      RDW 12.6 %      RDW-SD 41.5 fl      MPV 9.4 fL      Platelets 239 10*3/mm3      Neutrophil % 81.5 %      Lymphocyte % 14.4 %      Monocyte % 3.6 %       Eosinophil % 0.1 %      Basophil % 0.1 %      Immature Grans % 0.3 %      Neutrophils, Absolute 7.24 10*3/mm3      Lymphocytes, Absolute 1.28 10*3/mm3      Monocytes, Absolute 0.32 10*3/mm3      Eosinophils, Absolute 0.01 10*3/mm3      Basophils, Absolute 0.01 10*3/mm3      Immature Grans, Absolute 0.03 10*3/mm3      nRBC 0.0 /100 WBC            Imaging:    XR Chest 1 View    Result Date: 3/21/2023  PROCEDURE: XR CHEST 1 VW  COMPARISON: None  INDICATIONS: Chest Pain Triage Protocol  FINDINGS:  Lungs are well expanded and appear clear.  No pneumothorax or large pleural effusion is seen.  Cardiomediastinal contours appear within normal limits.        No acute cardiopulmonary abnormality is identified.       KINGS PADILLA MD       Electronically Signed and Approved By: KINGS PADILLA MD on 3/21/2023 at 18:57                 Differential Diagnosis and Discussion:    Chest Pain:  Based on the patient's signs and symptoms, I considered aortic dissection, myocardial infaction, pulmonary embolism, cardiac tamponade, pericarditis, pneumothorax, musculoskeletal chest pain and other differential diagnosis as an etiology of the patient's chest pain.     All labs were reviewed and interpreted by me.  All X-rays were independently reviewed by me.  EKG was interpreted by me.    MDM  Number of Diagnoses or Management Options  Diagnosis management comments: Patient presents to the emergency department with chest pain.  At time of evaluation chest pain is mostly resolved.  He states pain is about a 1 out of 10 at this time.  Does report radiation of pain into his jaw.  Patient was exercising at onset of pain.  He denies any known history of heart disease.  Does report history of hyperlipidemia.  He also reports family history of heart disease.  EKG shows sinus rhythm no acute ST elevation.  Initial troponin elevated to 94 with repeat up to 130 with a delta of 36.  At this point concern for NSTEMI.  Patient was started on  heparin.  I discussed patient with cardiologist Dr. Mart who recommended heparin and Nitropaste 0.5 inch and admission for further care and work-up.  Discussed patient with hospitalist and he will be admitted for further care.         Amount and/or Complexity of Data Reviewed  Clinical lab tests: reviewed  Tests in the radiology section of CPT®: reviewed    Risk of Complications, Morbidity, and/or Mortality  Presenting problems: moderate  Management options: moderate         Critical Care Note: Total Critical Care time of 40 minutes. Total critical care time documented does not include time spent on separately billed procedures for services of nurses or physician assistants. I personally saw and examined the patient. I have reviewed all diagnostic interpretations and treatment plans as written. I was present for the key portions of any procedures performed and the inclusive time noted in any critical care statement. Critical care time includes patient management by me, time spent at the patients bedside,  time to review lab and imaging results, discussing patient care, documentation in the medical record, and time spent with family or caregiver.    Patient Care Considerations:          Consultants/Shared Management Plan:    Hospitalist: I have discussed the case with Dr Peace who agrees to accept the patient for admission.  Consultant: I have discussed the case with Dr Mart who agrees to consult on the patient.    Social Determinants of Health:    Patient is independent, reliable, and has access to care.       Disposition and Care Coordination:    Admit:   Through independent evaluation of the patient's history, physical, and imperical data, the patient meets criteria for observation/admission to the hospital.        Final diagnoses:   NSTEMI (non-ST elevated myocardial infarction) (HCC)        ED Disposition     ED Disposition   Decision to Admit    Condition   --    Comment   Level of Care: Telemetry [5]    Diagnosis: NSTEMI (non-ST elevated myocardial infarction) ([facility]) [[id number]]   Admitting Physician: NANCY MINOR [[id number]]   Attending Physician: NANCY MINOR [[id number]]   Certification: I Certify That Inpatient Hospital Services Are Medically Necessary For Greater Than 2 Midnights               This medical record created using voice recognition software.      Documentation assistance provided by Jorge Leung acting as scribe for Antony Tong MD. Information recorded by the scribe was done at my direction and has been verified and validated by me.       Jorge Leung  03/21/23 2203       Jorge Leung  03/21/23 2205       Jorge Leung  03/21/23 2230       Antony Tong MD  03/22/23 0119

## 2023-03-22 NOTE — PROGRESS NOTES
[facility]   Hospitalist Progress Note  Date: 3/22/2023  Patient Name: Freddy Colon  : 1975  MRN: [id number]  Date of admission: 3/21/2023      Subjective   Subjective     Chief Complaint: Follow up for chest pain    Summary: 49 y/o M no PMHx who presented with chest pain following completion of a 17 mile bike ride. He went to urgent care where he was found to be bradycardic and transferred to our facility via EMS. EKG showed sinus rhythm with a leftward axis and early transition as well as some lateral ST-T wave changes.  Initial troponin returned elevated and a repeat troponin showed an upward trend at which point patient was started on heparin drip.  TTE pending.  Cardiology on board.     Interval Followup:   NAEON.  No signs stable.  Feeling fine.  No chest pain, palpitations, dyspnea.  On heparin drip.  No bleeding events.  No acute complaint. n.p.o. for cardiac cath    Review of Systems  Respiratory:  No Cough, No Dyspnea  Gastrointestinal:  No Nausea, No Vomiting    Objective   Objective     Vitals:   Temp:  [97.3 °F (36.3 °C)-98.6 °F (37 °C)] 98.3 °F (36.8 °C)  Heart Rate:  [51-70] 67  Resp:  [16-18] 18  BP: ()/(44-72) 104/67  Physical Exam    Constitutional: WNWD, conversant, NAD   Eyes: Pupils equal and reactive, no conjunctival injections   HENT: NCAT, nares patent, MMM   Respiratory: Clear to auscultation bilaterally, nonlabored respirations    Cardiovascular: RRR, no murmurs, no edema   Gastrointestinal: Positive bowel sounds, soft, nontender, nondistended   Neurologic: Alert, Cranial Nerves grossly intact, speech clear   Skin: Extremities warm, no rashes or wounds    Result Review    Result Review:  I have personally reviewed the following over the last 24 hours (07:00 to 07:00) and agree with the following findings  [x]  Laboratory   CBC    CBC 3/21/23 3/21/23    1842 2241   WBC 10.57 8.89   RBC 4.48 4.56   Hemoglobin 13.8 14.0   Hematocrit 40.4 40.8   MCV 90.2 89.5   MCH 30.8 30.7    MCHC 34.2 34.3   RDW 12.4 12.6   Platelets 225 239           BMP    BMP 3/21/23   BUN 25 (A)   Creatinine 0.93   Sodium 135 (A)   Potassium 4.5   Chloride 101   CO2 23.0   Calcium 9.1   (A) Abnormal value            []  Microbiology  [x]  Radiology  [x]  EKG/Telemetry monitored reviewed: NSR/sinus bradycardia.  Heart rate in the 40s   [x]  Cardiology/Vascular   TTE pending  []  Pathology  []  Old records  []  Other:    Assessment & Plan   Assessment / Plan     Assessment/Plan:  Active Hospital Problems    Diagnosis  POA   • **NSTEMI (non-ST elevated myocardial infarction) ([facility]) [I21.4]  Yes   • Therapeutic drug monitoring [Z51.81]  Not Applicable   • Sinus bradycardia [R00.1]  Unknown       Cardiology on board.  Appreciate recommendations.  Left heart catheterization planned.  Continue LR 75 cc/h.  Continue aspirin 81 mg q.daily.  Continue high intensity statin.  Continue heparin drip/PTT monitoring per ACS protocol.  Beta-blocker on hold given bradycardia.  Continue telemetry.  TTE pending.  Lipid panel, hemoglobin A1c pending.    Discussed plan with RN and Dr Mart    DVT prophylaxis:  Medical DVT prophylaxis orders are present.    CODE STATUS:   Level Of Support Discussed With: Patient  Code Status (Patient has no pulse and is not breathing): CPR (Attempt to Resuscitate)  Medical Interventions (Patient has pulse or is breathing): Full Support        Electronically signed by Corwin Ko DO, 03/22/23, 7:46 AM EDT.

## 2023-03-22 NOTE — CASE MANAGEMENT/SOCIAL WORK
Discharge Planning Assessment   Jean     Patient Name: Freddy Colon  MRN: [id number]  Today's Date: 3/22/2023    Admit Date: 3/21/2023    Plan: Mr. Colon is admitted due to NSTEMI, met with patient at bedside, reviewed facesheet. Patient is independent with ADL's, works full time, and has no discharge concerns at this time. Patient will return home at discharge. RNCM will follow for new discharge medication.   Discharge Needs Assessment     Row Name 03/22/23 1019       Living Environment    People in Home alone    Current Living Arrangements home    Primary Care Provided by self    Family Caregiver if Needed parent(s)    Quality of Family Relationships helpful;involved;supportive    Able to Return to Prior Arrangements yes       Resource/Environmental Concerns    Resource/Environmental Concerns none    Transportation Concerns none       Transition Planning    Patient/Family Anticipates Transition to home    Patient/Family Anticipated Services at Transition none    Transportation Anticipated family or friend will provide       Discharge Needs Assessment    Readmission Within the Last 30 Days no previous admission in last 30 days    Equipment Currently Used at Home none    Concerns to be Addressed no discharge needs identified    Anticipated Changes Related to Illness none    Equipment Needed After Discharge none    Provided Post Acute Provider List? N/A    Provided Post Acute Provider Quality & Resource List? N/A               Discharge Plan     Row Name 03/22/23 1019       Plan    Plan Mr. Colon is admitted due to NSTEMI, met with patient at bedside, reviewed facesheet. Patient is independent with ADL's, works full time, and has no discharge concerns at this time. Patient will return home at discharge. RNCM will follow for new discharge medication.    Final Discharge Disposition Code 01 - home or self-care              Continued Care and Services - Admitted Since 3/21/2023    Coordination has not been  started for this encounter.          Demographic Summary     Row Name 03/22/23 1016       General Information    Admission Type inpatient    Arrived From home;emergency department    Referral Source admission list    Reason for Consult discharge planning    Preferred Language English       Contact Information    Permission Granted to Share Info With family/designee    Contact Information Obtained for                Functional Status     Row Name 03/22/23 1018       Functional Status    Usual Activity Tolerance good    Current Activity Tolerance good       Assessment of Health Literacy    How often do you have someone help you read hospital materials? Occasionally    How often do you have problems learning about your medical condition because of difficulty understanding written information? Never    How often do you have a problem understanding what is told to you about your medical condition? Never    How confident are you filling out medical forms by yourself? Quite a bit    Health Literacy Good       Functional Status, IADL    Medications independent    Meal Preparation independent    Housekeeping independent    Laundry independent    Shopping independent       Mental Status    General Appearance WDL WDL       Mental Status Summary    Recent Changes in Mental Status/Cognitive Functioning no changes       Employment/    Employment Status employed full-time    Shift Worked first shift    Current or Previous Occupation factory work               Psychosocial    No documentation.                Abuse/Neglect    No documentation.                Legal    No documentation.                Substance Abuse    No documentation.                Patient Forms    No documentation.                   Sangita Hardwick RN

## 2023-03-22 NOTE — CONSULTS
Cardiology Consult Note  [address] SERVICES          Patient Identification:  Freddy Colon      [id number]  48 y.o.        male  1975           Reason for Consultation: Non-STEMI    PCP: Yury Mosley MD    History of Present Illness:     Patient is a 48-year-old with a previous history of some dyslipidemia and prior smoker who exercises regularly riding his bike over the last week he has had some intermittent episodes occurring with his riding x2 with some substernal chest pain with radiation to his jaw all these have been more mild in nature.  Yesterday though after completing an episode of riding he developed chest pressure and jaw pain that lasted for about an hour.  The patient initially took some Gas-X with some partial relief but then also developed some nausea and lightheadedness and went in for evaluation.  He was noted to have an elevated high-sensitivity troponin with some peaked T waves on his EKG but his chest pain symptoms have resolved.  He is resting comfortably this morning.  He did have some bradycardia sinus in his blood pressure in the 90s as well initially when he was evaluated in the emergency room.    Past History:  History reviewed. No pertinent past medical history.  History reviewed. No pertinent surgical history.  No Known Allergies  Social History     Socioeconomic History   • Marital status:    Tobacco Use   • Smoking status: Never     Passive exposure: Never   • Smokeless tobacco: Never   Vaping Use   • Vaping Use: Never used   Substance and Sexual Activity   • Alcohol use: Yes     Alcohol/week: 6.0 standard drinks     Types: 6 Cans of beer per week   • Drug use: Never   • Sexual activity: Defer     History reviewed. No pertinent family history.    Medications:  Prior to Admission medications    Not on File      Current medications:  aspirin, 324 mg, Oral, Once  aspirin, 81 mg, Oral, Daily  metoprolol tartrate, 25 mg, Oral,  "Q12H  rosuvastatin, 20 mg, Oral, Nightly  sodium chloride, 10 mL, Intravenous, Q12H      Current IV drips:  heparin, 12 Units/kg/hr, Last Rate: 12 Units/kg/hr (03/21/23 2924)  lactated ringers, 75 mL/hr, Last Rate: 75 mL/hr (03/22/23 0102)        Review of Systems   Constitutional: Negative for chills, fever and weight loss.   HENT: Negative for congestion and nosebleeds.    Cardiovascular: Positive for chest pain. Negative for orthopnea and paroxysmal nocturnal dyspnea.   Respiratory: Negative for cough and shortness of breath.    Endocrine: Negative for cold intolerance and heat intolerance.   Skin: Negative for rash.   Musculoskeletal: Negative for back pain and muscle weakness.   Gastrointestinal: Positive for nausea. Negative for abdominal pain and vomiting.   Genitourinary: Negative for dysuria and nocturia.   Neurological: Negative for dizziness and light-headedness.   Psychiatric/Behavioral: Negative for altered mental status and hallucinations.         Physical Exam    /67 (BP Location: Right arm, Patient Position: Lying)   Pulse 67   Temp 98.3 °F (36.8 °C) (Oral)   Resp 18   Ht 175.3 cm (69\")   Wt 82.5 kg (181 lb 14.1 oz)   SpO2 97%   BMI 26.86 kg/m²  Body mass index is 26.86 kg/m².   Oxygen saturation   @FLOWAN(10::1)@ SpO2  Min: 92 %  Max: 100 %    General Appearance:   · no acute distress  · Alert and oriented x3  HENT:   · lips not cyanotic  · Atraumatic  Neck:  · thyroid not enlarged  · supple  Respiratory:  · no respiratory distress  · normal breath sounds  · no rales  Cardiovascular:  · no jugular venous distention  · regular rhythm  · apical impulse normal  · S1 normal, S2 normal  · no S3, no S4   · no murmur  · no rub, no thrill  · no carotid bruit  · pedal pulses normal  · lower extremity edema: none    Gastrointestinal:   · bowel sounds normal  · non-tender  · no hepatomegaly, no splenomegaly  Musculoskeletal:  · no clubbing of fingers.   · normocephalic, head atraumatic  Skin: "   · warm, dry  · No rashes  Neuro/Psychiatric:  · normal mood and affect  · judgement and insight appropriate      Cardiographics:     ECG  (personally reviewed)                Telemetry:  (personally reviewed) normal sinus rhythm and sinus bradycardia          No results found for this or any previous visit.      Cardiolite (Tc-99m Sestamibi) stress test   Lab Review:       CBC    CBC 3/21/23 3/21/23    1842 2241   WBC 10.57 8.89   RBC 4.48 4.56   Hemoglobin 13.8 14.0   Hematocrit 40.4 40.8   MCV 90.2 89.5   MCH 30.8 30.7   MCHC 34.2 34.3   RDW 12.4 12.6   Platelets 225 239             CMP    CMP 3/21/23   Glucose 124 (A)   BUN 25 (A)   Creatinine 0.93   eGFR 102.6   Sodium 135 (A)   Potassium 4.5   Chloride 101   Calcium 9.1   Total Protein 6.3   Albumin 4.2   Globulin 2.1   Total Bilirubin 0.3   Alkaline Phosphatase 48   AST (SGOT) 22   ALT (SGPT) 24   Albumin/Globulin Ratio 2.0   BUN/Creatinine Ratio 26.9 (A)   Anion Gap 11.0   (A) Abnormal value               CARDIAC LABS:      Lab 03/21/23 2241 03/21/23 2056 03/21/23 1842   PROBNP  --   --  45.7   HSTROP T  --  130* 94*   PROTIME 13.7  --   --    INR 1.04  --   --       No results found for: DIGOXIN   No results found for: TSH        Invalid input(s): LDLCALC  No results found for: POCTROP  No components found for: DDIMERQUAN  Lab Results   Component Value Date    MG 1.8 03/21/2023             CARDIAC LABS:      Lab 03/21/23 2241 03/21/23 2056 03/21/23 1842   PROBNP  --   --  45.7   HSTROP T  --  130* 94*   PROTIME 13.7  --   --    INR 1.04  --   --         Imaging:  CXR   Impression:  No acute cardiopulmonary abnormality is identified             Assessment:    NSTEMI (non-ST elevated myocardial infarction) ([facility])    Therapeutic drug monitoring      Non-ST ovation myocardial infarction and patient with symptoms that sound consistent with angina.  Recommend proceeding with left heart catheterization discussed risk benefits alternatives including the risk  of bleeding, kidney dysfunction, MI, stroke, and death patient was agreeable proceeding.    Plan:  1.  Aspirin 81 mg daily  2.  Nitroglycerin paste half inch 3 times daily  3.  Heparin GGT  4.  Left heart catheterization with possible PCI  5.  Echocardiogram  6.  Crestor 20 mg nightly  7.  Hold off on beta-blocker due to sinus bradycardia      Thank you for allowing us to share in [facility].            Goyo Mart MD   3/22/2023    09:00 EDT

## 2023-03-22 NOTE — PLAN OF CARE
Goal Outcome Evaluation:  Plan of Care Reviewed With: patient        Progress: no change  Outcome Evaluation: patient remains NPO and pain free. Awaiting consult with cardiologist

## 2023-03-22 NOTE — PLAN OF CARE
Goal Outcome Evaluation:  Plan of Care Reviewed With: patient        Progress: improving  Outcome Evaluation: Patient stable since transfer to [facility], TR band in place from cath lab, no complications noted, patient educated on arm positioning, no concerns or complaints at this time, will continue to monitor. Discharge tomorrow.

## 2023-03-22 NOTE — H&P
" [facility]   HOSPITALIST HISTORY AND PHYSICAL  Date: 3/21/2023   Patient Name: Freddy Colon  : 1975  MRN: [id number]  Primary Care Physician:  Yury Mosley MD  Date of admission: 3/21/2023    Subjective   Subjective     Chief Complaint: Chest pain    HPI:    Freddy Colon is a 48 y.o. male no past medical history who presents to the ER due to chest pain.  Patient states that earlier today he completed a 17 mile bike ride and afterwards was eating something at which point he felt a \"pressure\" in his epigastric region that radiated up to into his chest.  He states this is happened on and off for 10 years now but this episode was associated with a chest pressure that was unusual.  He states he took some Gas-X and had some improvement in his epigastric pressure but continued to have persistent chest pressure with radiation to bilateral jaws as well as a feeling of nausea and near syncope.  This prompted him to go into the urgent care for further evaluation.  Upon arrival there he was bradycardic at 1 point and given his symptoms he was transferred by EMS to our facility for rule out of an MI.  EKG upon arrival here showed sinus rhythm with a leftward axis and early transition as well as some lateral ST-T wave changes.  Initial troponin returned elevated and a repeat troponin showed an upward trend at which point patient was started on heparin drip.  Hospitalist service contacted for admission.  At the time my exam patient is chest pain-free.  Denies having any nausea or vomiting currently.  Heart rate is well controlled.  Blood pressure well controlled.  Patient states he previously was supposed to be on a statin but has not been taking it due to intolerance.  He states he takes fish oils but no other supplements.  He states for the last week and a half he has had some chest pressure when starting his regular bike ride.  It does seem to subside with rest.  Denies having any pleuritic nature to the " pain.  No lower extremity edema.  He does have a family history of heart disease on the side of his father with multiple relatives having ischemic heart disease.  He previously was a smoker of at least 13 pack years but has quit for 10 years now.      Personal History     Past Medical History:  History reviewed. No pertinent past medical history.      Past Surgical History:  History reviewed. No pertinent surgical history.      Family History:   Reviewed and noncontributory except as mentioned in HPI    Social History:   Social Determinants of Health     Tobacco Use: Low Risk    • Smoking Tobacco Use: Never   • Smokeless Tobacco Use: Never   • Passive Exposure: Never   Alcohol Use: Not on file   Financial Resource Strain: Not on file   Food Insecurity: Not on file   Transportation Needs: Not on file   Physical Activity: Not on file   Stress: Not on file   Social Connections: Not on file   Intimate Partner Violence: Not on file   Depression: Not on file   Housing Stability: Not on file         Home Medications:       Allergies:  No Known Allergies    Review of Systems   All systems were reviewed and negative except for: Chest pain, nausea, near syncope    Objective   Objective     Vitals:   Temp:  [97.3 °F (36.3 °C)-98.4 °F (36.9 °C)] 98.4 °F (36.9 °C)  Heart Rate:  [51-70] 56  Resp:  [16-18] 16  BP: ()/(55-67) 103/66    Physical Exam    Constitutional: Awake, alert, no acute distress   Eyes: Pupils equal, sclerae anicteric, no conjunctival injection   HENT: NCAT, mucous membranes moist   Neck: Supple, no thyromegaly, no lymphadenopathy, trachea midline   Respiratory: Clear to auscultation bilaterally, nonlabored respirations    Cardiovascular: RRR, no murmurs, rubs, or gallops, palpable pedal pulses bilaterally   Gastrointestinal: Positive bowel sounds, soft, nontender, nondistended   Musculoskeletal: No bilateral ankle edema, no clubbing or cyanosis to extremities   Psychiatric: Appropriate affect,  cooperative   Neurologic: Oriented x 3, strength symmetric in all extremities, Cranial Nerves grossly intact to confrontation, speech clear   Skin: No rashes     Result Review    Result Review:  I have personally reviewed the results from the time of this admission to 3/21/2023 23:12 EDT and agree with these findings:  [x]  Laboratory  [x]  Microbiology  [x]  Radiology  [x]  EKG/Telemetry   [x]  Cardiology/Vascular   []  Pathology  [x]  Old records  []  Other:      Assessment & Plan   Assessment / Plan     Assessment/Plan:   Acute coronary syndrome, NSTEMI  History of hyperlipidemia    Plan  - Admit to hospital service  - Continue telemetry monitoring, trend troponins  - Continue heparin drip for ACS, cardiology consulted, keep n.p.o. at midnight in case cardiac catheterization is planned  - Nitroglycerin as needed for chest pain, supportive care  - Check lipid panel and A1c for restratification given his family history patient will need to be on high intensity statin going forward.  Aspirin started.  Beta-blockers to start in a.m. if can tolerate.  Echocardiogram also ordered         Discussed with ER Physician and Nurse      DVT Prophylaxis: Heparin drip    CODE STATUS:    Level Of Support Discussed With: Patient  Code Status (Patient has no pulse and is not breathing): CPR (Attempt to Resuscitate)  Medical Interventions (Patient has pulse or is breathing): Full Support      Admission Status:  I believe this patient meets inpatient status.    Electronically signed by Elia Peace MD, 03/21/23, 11:12 PM EDT.

## 2023-03-23 ENCOUNTER — READMISSION MANAGEMENT (OUTPATIENT)
Dept: CALL CENTER | Facility: HOSPITAL | Age: 48
End: 2023-03-23
Payer: COMMERCIAL

## 2023-03-23 VITALS
OXYGEN SATURATION: 96 % | HEIGHT: 69 IN | RESPIRATION RATE: 16 BRPM | TEMPERATURE: 98.2 F | DIASTOLIC BLOOD PRESSURE: 68 MMHG | HEART RATE: 69 BPM | WEIGHT: 315 LBS | SYSTOLIC BLOOD PRESSURE: 101 MMHG | BODY MASS INDEX: 46.65 KG/M2

## 2023-03-23 PROBLEM — Z51.81 THERAPEUTIC DRUG MONITORING: Status: RESOLVED | Noted: 2023-03-22 | Resolved: 2023-03-23

## 2023-03-23 PROBLEM — I25.10 STENOSIS OF RIGHT CORONARY ARTERY: Status: ACTIVE | Noted: 2023-03-23

## 2023-03-23 PROBLEM — E78.5 DYSLIPIDEMIA: Status: ACTIVE | Noted: 2023-03-23

## 2023-03-23 LAB
ANION GAP SERPL CALCULATED.3IONS-SCNC: 10.2 MMOL/L (ref 5–15)
BUN SERPL-MCNC: 12 MG/DL (ref 6–20)
BUN/CREAT SERPL: 13.8 (ref 7–25)
CALCIUM SPEC-SCNC: 9 MG/DL (ref 8.6–10.5)
CHLORIDE SERPL-SCNC: 104 MMOL/L (ref 98–107)
CO2 SERPL-SCNC: 24.8 MMOL/L (ref 22–29)
CREAT SERPL-MCNC: 0.87 MG/DL (ref 0.76–1.27)
DEPRECATED RDW RBC AUTO: 42.1 FL (ref 37–54)
EGFRCR SERPLBLD CKD-EPI 2021: 107.8 ML/MIN/1.73
ERYTHROCYTE [DISTWIDTH] IN BLOOD BY AUTOMATED COUNT: 12.9 % (ref 12.3–15.4)
GLUCOSE SERPL-MCNC: 88 MG/DL (ref 65–99)
HCT VFR BLD AUTO: 38.3 % (ref 37.5–51)
HGB BLD-MCNC: 13.3 G/DL (ref 13–17.7)
MCH RBC QN AUTO: 31.4 PG (ref 26.6–33)
MCHC RBC AUTO-ENTMCNC: 34.7 G/DL (ref 31.5–35.7)
MCV RBC AUTO: 90.5 FL (ref 79–97)
PLATELET # BLD AUTO: 196 10*3/MM3 (ref 140–450)
PMV BLD AUTO: 9.6 FL (ref 6–12)
POTASSIUM SERPL-SCNC: 4 MMOL/L (ref 3.5–5.2)
RBC # BLD AUTO: 4.23 10*6/MM3 (ref 4.14–5.8)
SODIUM SERPL-SCNC: 139 MMOL/L (ref 136–145)
WBC NRBC COR # BLD: 7.18 10*3/MM3 (ref 3.4–10.8)

## 2023-03-23 PROCEDURE — 94799 UNLISTED PULMONARY SVC/PX: CPT

## 2023-03-23 PROCEDURE — 80048 BASIC METABOLIC PNL TOTAL CA: CPT | Performed by: INTERNAL MEDICINE

## 2023-03-23 PROCEDURE — 99239 HOSP IP/OBS DSCHRG MGMT >30: CPT | Performed by: INTERNAL MEDICINE

## 2023-03-23 PROCEDURE — 99231 SBSQ HOSP IP/OBS SF/LOW 25: CPT | Performed by: INTERNAL MEDICINE

## 2023-03-23 PROCEDURE — 85027 COMPLETE CBC AUTOMATED: CPT | Performed by: INTERNAL MEDICINE

## 2023-03-23 RX ORDER — ASPIRIN 81 MG/1
81 TABLET, CHEWABLE ORAL DAILY
Qty: 90 TABLET | Refills: 3 | Status: SHIPPED | OUTPATIENT
Start: 2023-03-24

## 2023-03-23 RX ORDER — ROSUVASTATIN CALCIUM 20 MG/1
20 TABLET, COATED ORAL NIGHTLY
Qty: 90 TABLET | Refills: 3 | Status: SHIPPED | OUTPATIENT
Start: 2023-03-23

## 2023-03-23 RX ORDER — NITROGLYCERIN 0.4 MG/1
0.4 TABLET SUBLINGUAL
Status: DISCONTINUED | OUTPATIENT
Start: 2023-03-23 | End: 2023-04-06

## 2023-03-23 RX ADMIN — Medication 10 ML: at 08:10

## 2023-03-23 RX ADMIN — ASPIRIN 81 MG 81 MG: 81 TABLET ORAL at 08:09

## 2023-03-23 RX ADMIN — TICAGRELOR 90 MG: 90 TABLET ORAL at 02:56

## 2023-03-23 NOTE — OUTREACH NOTE
Prep Survey    Flowsheet Row Responses   [facility] facility patient discharged from? [facility]   Is LACE score < 7 ? Yes   Eligibility Not Eligible   What are the reasons patient is not eligible? Other  [Low risk for readmission]   Does the patient have one of the following disease processes/diagnoses(primary or secondary)? Other   Prep survey completed? Yes          Leona GEORGES - Registered Nurse

## 2023-03-23 NOTE — DISCHARGE SUMMARY
[facility]         HOSPITALIST  DISCHARGE SUMMARY    Patient Name: Freddy Colon  : 1975  MRN: [id number]    Date of Admission: 3/21/2023  Date of Discharge:  23  Primary Care Physician: Yury Mosley MD    Consults     Date and Time Order Name Status Description    3/22/2023 12:23 AM Inpatient Cardiology Consult      3/21/2023 10:34 PM Inpatient Cardiology Consult      3/21/2023 10:21 PM Inpatient Hospitalist Consult            Active and Resolved Hospital Problems:  Active Hospital Problems    Diagnosis POA   • Dyslipidemia [E78.5] Unknown   • Stenosis of right coronary artery [I25.10] Unknown   • Sinus bradycardia [R00.1] Unknown   • Status post left heart catheterization [Z98.890] Not Applicable      Resolved Hospital Problems    Diagnosis POA   • **NSTEMI (non-ST elevated myocardial infarction) (HCC) [I21.4] Yes   • Therapeutic drug monitoring [Z51.81] Not Applicable       Hospital Course     Hospital Course:  Freddy Colon is a 48 y.o. male with no past medical history who presented with chest pain following completion of a 17 mile bike ride. He went to urgent care where he was found to be bradycardic and transferred to our facility via EMS. EKG showed sinus rhythm with a leftward axis and early transition as well as some lateral ST-T wave changes.  Initial troponin returned elevated and a repeat troponin showed an upward trend at which point patient was started on heparin drip along with aspirin and high intensity statin. Cardiology consulted. TTE showed preserved ejection with no wall motion abnormalities.  Underwent left heart catheterization which showed a 90% stenosis involving the RCA and a drug-eluting stent was placed.  No issues with procedure.  Remained chest pain-free. Hemoglobin A1c 5.1. Total cholesterol 228, HDL 49, , triglycerides 80. Beta-blocker/ACE inhibitor was not added due to sinus bradycardia and soft blood pressures.  Brilinta was added and  dual antiplatelet therapy will be continued for 1 year.  Tolerated oral intake and ambulate independently.  Cleared from cardiology standpoint.  Outpatient referral to cardiac rehab placed.  He will follow-up with his family provider and cardiologist as instructed.  At home care, new medications, activity restrictions provided.  Discharged home in stable condition    Day of Discharge     Vital Signs:  Temp:  [98.1 °F (36.7 °C)-98.8 °F (37.1 °C)] 98.2 °F (36.8 °C)  Heart Rate:  [58-83] 69  Resp:  [16-20] 16  BP: ()/(62-92) 101/68  Flow (L/min):  [3] 3  Physical Exam:   GENERAL: The patient is conversant and nontoxic.  HEENT: PERRLA, Oropharynx clear. Moist mucous membranes.   HEART: Regular rate and rhythm. No edema  LUNGS: Equal air entry, clear to auscultation bilaterally.  ABDOMEN: Soft, positive bowel sounds, nontender, nondistended  SKIN: No rash or open wounds   NEUROLOGIC: Alert, cranial nerves grossly intact, speech clear    Discharge Details        Discharge Medications      New Medications      Instructions Start Date   aspirin 81 MG chewable tablet   81 mg, Oral, Daily   Start Date: March 24, 2023     rosuvastatin 20 MG tablet  Commonly known as: CRESTOR   20 mg, Oral, Nightly      ticagrelor 90 MG tablet tablet  Commonly known as: BRILINTA   90 mg, Oral, 2 Times Daily             No Known Allergies    Discharge Disposition:  Home or Self Care    Diet:  Hospital:  Diet Order   Procedures   • Diet: Cardiac Diets, Diabetic Diets; Healthy Heart (2-3 Na+); Consistent Carbohydrate; Texture: Regular Texture (IDDSI 7); Fluid Consistency: Thin (IDDSI 0)       Discharge Activity:   Activity Instructions     Activity as Tolerated      Lifting Restrictions      Type of Restriction: Lifting    Lifting Restrictions: Avoid Straining to Lift    Length of Lifting Restriction: until cleared by provider          CODE STATUS:  Code Status and Medical Interventions:   Ordered at: 03/21/23 0238     Level Of Support  Discussed With:    Patient     Code Status (Patient has no pulse and is not breathing):    CPR (Attempt to Resuscitate)     Medical Interventions (Patient has pulse or is breathing):    Full Support         No future appointments.    Additional Instructions for the Follow-ups that You Need to Schedule     Ambulatory Referral to Cardiac Rehab   As directed      Discharge Follow-up with PCP   As directed       Currently Documented PCP:    Yury Mosley MD    PCP Phone Number:    [phone]     Follow Up Details: 1 week         Discharge Follow-up with Specified Provider: Dr Mart; 2 Weeks   As directed      To: Dr Mart    Follow Up: 2 Weeks               Pertinent  and/or Most Recent Results     PROCEDURES:      Procedure performed:  Right radial arterial sheath placement  Left Heart Catheterization  Selective coronary Angiography  IVUS evaluation of the RCA  Successful percutaneous coronary intervention to mid RCA with a 3.0 x 38 mm Xience drug-eluting stent postdilated in the mid and proximal segment up to 3.5 and 3.9 mm range.  Moderate conscious sedation  TR Terumo band device placement for hemostasis     Details of the procedure:  Informed consent was obtained with an explanation of the risks, benefits and alternatives of the procedure. The patient was brought to the Cardiac Catheterization Laboratory in a fasting state.  The patient was prepped and draped in a standard, sterile fashion. Moderate conscious sedation with Fentanyl and Versed was administered by the circulating nurse. Lidocaine 2% was used to anesthetize the right radial artery and a 5/6 Taiwanese glidesheath was placed via modified Seldinger technique.  TIG catheter was used to selectively engage both coronary arteries.  Multiple scintigraphic views were obtained.  The Tygh catheter was used to cross the aortic valve.  Left heart hemodynamics and left ventriculogram was obtained.           Findings:  1. Coronary Artery Anatomy:  Dominance:  Right  Left Main: Short vessel, gives off LAD and circumflex.  No angiographic disease.  Left Anterior Descending: Moderate size vessel giving off diagonals and septal perforators.  There is mild diffuse disease in the proximal to mid segment.  Left Circumflex Artery: Moderate size vessel.  There is mild ostial narrowing at 10 to 20%.  Mild disease is noted at the bifurcation of an OM/AV groove circumflex.  Right Coronary Artery: Moderate size vessel dominant for the posterior circulation.  There is moderate to severe diffuse disease in the mid segment with a focal stenosis up to 90%.     2. Hemodynamics:              The opening aortic pressure is 110/70 mmHg.   The left ventricular end-diastolic pressure is 12 mmHg.  There was no gradient across aortic valve on pullback                         4. Percutaneous Intervention:  Location: Mid RCA  Treatment: 3.0 x 38 mm Xience drug-eluting stent postdilated after IVUS evaluation up to 3.5 in the mid segment and close to 3.9 mm in the proximal segment.  Pre-stenosis: 90%  Post-stenosis: Near 0%  ALEXA Flow Pre: 3  ALEXA Flow Post: 3  Bifurcation: No significant  Severe Calcium: No  Dissection: No       Details of angioplasty:      Heparin was used for anticoagulation throughout the procedure with frequent checking of ACT.  A JR4 guide catheter was used to selectively cannulate the RCA.  A BMW wire was used to selectively cannulate the RCA with mild manipulation but no major difficulty.  Initially a 2.5 x 20 mm noncompliant balloon was dilated in the proximal to mid RCA.  This balloon was unable to cross the severe stenosis that was pretty much occluded flow with the wire.  A 1.0 x 10 mm noncompliant balloon was used to dilate the severe stenosis.  Then the 2.5 x 20 mm noncompliant balloon was reexpanded across this area.  IVUS evaluation was done of the RCA.  A 3.0 x 38 mm Xience drug-eluting stent was deployed in the proximal to mid RCA up to 14 jamie.  With IVUS  evaluation the mid stent was postdilated to 3.5 mm and the proximal area of the stent was postdilated up to 3.9 mm.  Follow-up angiography revealed excellent results.  No dissections or wire perforations were noted.  The patient was given 324 ASA and 180 mg of Brilinta during the procedure.       At the end of the procedure, the right radial arterial sheath was removed and a TR band was applied for hemostasis.  Adequate hemostasis was achieved.  Patient tolerated procedure well without any complications.  The results of the test were explained in detail to the patient.     Cumulative fluoroscopy time: 15.8 min      Cumulative air kerma: 606 mGy     Total amount of contrast used: See report ml of Isovue         Complications:  None.     Estimated Blood Loss:  Minimal.     Conclusions:  Severe diffuse disease in the proximal to mid RCA with focal segment up to 90%.  Status post 3.0 x 38 mm Xience drug-eluting stent postdilated in the mid segment up to 3.5 mm and the proximal segment up to 3.9 after IVUS evaluation.  Mild nonobstructive disease in the left system.  Normal LVEDP     Recommendations:   Continue aspirin and Brilinta.  Goal-directed medical therapy.  Risk factor reduction.     Mikael Mccollum MD     03/22/23  15:41 EDT       LAB RESULTS:      Lab 03/23/23  0440 03/22/23  1154 03/22/23  0444 03/21/23  2241 03/21/23  1842   WBC 7.18  --   --  8.89 10.57   HEMOGLOBIN 13.3  --   --  14.0 13.8   HEMATOCRIT 38.3  --   --  40.8 40.4   PLATELETS 196  --   --  239 225   NEUTROS ABS  --   --   --  7.24* 8.78*   IMMATURE GRANS (ABS)  --   --   --  0.03 0.03   LYMPHS ABS  --   --   --  1.28 1.27   MONOS ABS  --   --   --  0.32 0.46   EOS ABS  --   --   --  0.01 0.00   MCV 90.5  --   --  89.5 90.2   PROTIME  --   --   --  13.7  --    APTT  --  71.7* 46.6* 26.5*  --          Lab 03/23/23  0440 03/22/23  0641 03/21/23  1842   SODIUM 139  --  135*   POTASSIUM 4.0  --  4.5   CHLORIDE 104  --  101   CO2 24.8  --  23.0    ANION GAP 10.2  --  11.0   BUN 12  --  25*   CREATININE 0.87  --  0.93   EGFR 107.8  --  102.6   GLUCOSE 88  --  124*   CALCIUM 9.0  --  9.1   MAGNESIUM  --   --  1.8   HEMOGLOBIN A1C  --  5.10  --          Lab 03/21/23  1842   TOTAL PROTEIN 6.3   ALBUMIN 4.2   GLOBULIN 2.1   ALT (SGPT) 24   AST (SGOT) 22   BILIRUBIN 0.3   ALK PHOS 48   LIPASE 28         Lab 03/21/23 2241 03/21/23 2056 03/21/23  1842   PROBNP  --   --  45.7   HSTROP T  --  130* 94*   PROTIME 13.7  --   --    INR 1.04  --   --          Lab 03/22/23  0641   CHOLESTEROL 228*   LDL CHOL 163*   HDL CHOL 49   TRIGLYCERIDES 88             Brief Urine Lab Results     None        Microbiology Results (last 10 days)     ** No results found for the last 240 hours. **          XR Chest 1 View    Result Date: 3/21/2023  Impression:   No acute cardiopulmonary abnormality is identified.       KINGS PADILLA MD       Electronically Signed and Approved By: KINGS PADILLA MD on 3/21/2023 at 18:57                   Results for orders placed during the hospital encounter of 03/21/23    Adult Transthoracic Echo Complete W/ Cont if Necessary Per Protocol    Interpretation Summary  •  Left ventricular systolic function is normal. Calculated left ventricular EF = 62%  •  Left ventricular diastolic function was normal.  •  Estimated right ventricular systolic pressure from tricuspid regurgitation is normal (<35 mmHg).      Labs Pending at Discharge: NONE      Time spent on Discharge including face to face service: >30 minutes    Electronically signed by Corwin Ko DO, 03/23/23, 8:54 AM EDT.

## 2023-03-23 NOTE — PROGRESS NOTES
CARDIOLOGY  INPATIENT PROGRESS NOTE                [facility] UNIT 2    3/23/2023      PATIENT IDENTIFICATION:   Name:  Freddy Colon      MRN:  [id number]     48 y.o.  male                 SUBJECTIVE:   Patient with no new problems with chest pain or shortness of breath.  Did well overnight    OBJECTIVE:  Vitals:    03/23/23 0130 03/23/23 0230 03/23/23 0706 03/23/23 0745   BP: 97/72 109/84  101/68   BP Location: Left arm Left arm  Left arm   Patient Position: Lying Lying  Lying   Pulse: 58 70  69   Resp:  18  16   Temp:  98.2 °F (36.8 °C)  98.2 °F (36.8 °C)   TempSrc:  Oral  Oral   SpO2:  97% 97% 96%   Weight:       Height:               Body mass index is 55.32 kg/m².    Intake/Output Summary (Last 24 hours) at 3/23/2023 0845  Last data filed at 3/22/2023 1750  Gross per 24 hour   Intake 240 ml   Output --   Net 240 ml       Telemetry: Nonsustained V. tach 6 beat run yesterday at 3 PM otherwise shows normal sinus rhythm    Physical Exam  General Appearance:   · no acute distress  · Alert and oriented x3  HENT:   · lips not cyanotic  · Atraumatic  Neck:  · No jvd   · supple  Respiratory:  · no respiratory distress  · normal breath sounds  · no rales  Cardiovascular:    · regular rhythm  · no S3, no S4   · no murmur  · no rub  · lower extremity edema: none    Skin:   · warm, dry  · No rashes      No Known Allergies  Scheduled meds:  aspirin, 324 mg, Oral, Once  aspirin, 81 mg, Oral, Daily  rosuvastatin, 20 mg, Oral, Nightly  sodium chloride, 10 mL, Intravenous, Q12H  ticagrelor, 90 mg, Oral, BID      IV meds:                      heparin, 12 Units/kg/hr, Last Rate: 16 Units/kg/hr (03/22/23 1253)      Data Review:  CBC    CBC 3/21/23 3/21/23 3/23/23    1842 2241    WBC 10.57 8.89 7.18   RBC 4.48 4.56 4.23   Hemoglobin 13.8 14.0 13.3   Hematocrit 40.4 40.8 38.3   MCV 90.2 89.5 90.5   MCH 30.8 30.7 31.4   MCHC 34.2 34.3 34.7   RDW 12.4 12.6 12.9   Platelets 225 239 196           CMP    CMP  3/21/23 3/23/23   Glucose 124 (A) 88   BUN 25 (A) 12   Creatinine 0.93 0.87   eGFR 102.6 107.8   Sodium 135 (A) 139   Potassium 4.5 4.0   Chloride 101 104   Calcium 9.1 9.0   Total Protein 6.3    Albumin 4.2    Globulin 2.1    Total Bilirubin 0.3    Alkaline Phosphatase 48    AST (SGOT) 22    ALT (SGPT) 24    Albumin/Globulin Ratio 2.0    BUN/Creatinine Ratio 26.9 (A) 13.8   Anion Gap 11.0 10.2   (A) Abnormal value             CARDIAC LABS:      Lab 03/21/23  2241 03/21/23 2056 03/21/23  1842   PROBNP  --   --  45.7   HSTROP T  --  130* 94*   PROTIME 13.7  --   --    INR 1.04  --   --         No results found for: DIGOXIN   No results found for: TSH  Results from last 7 days   Lab Units 03/22/23  0641   CHOLESTEROL mg/dL 228*   HDL CHOL mg/dL 49     No results found for: POCTROP  Lab Results   Component Value Date    TROPONINT 130 (C) 03/21/2023   (  Lab Results   Component Value Date    MG 1.8 03/21/2023     Results for orders placed during the hospital encounter of 03/21/23    Adult Transthoracic Echo Complete W/ Cont if Necessary Per Protocol    Interpretation Summary  •  Left ventricular systolic function is normal. Calculated left ventricular EF = 62%  •  Left ventricular diastolic function was normal.  •  Estimated right ventricular systolic pressure from tricuspid regurgitation is normal (<35 mmHg).           ASSESSMENT:    Therapeutic drug monitoring    Sinus bradycardia    Status post left heart catheterization        PLAN:  1.  Aspirin/Brilinta x1 year  2.  Crestor 20 mg daily  3.  Sublingual nitroglycerin as needed  4.  No beta-blocker or ACE inhibitor due to low blood pressure and intermittent bradycardia  5.  Patient be discharged from cardiac standpoint will refer to outpatient cardiac rehab follow-up in 2 weeks.          Goyo Mart MD  3/23/2023    08:45 EDT

## 2023-03-23 NOTE — PLAN OF CARE
Goal Outcome Evaluation:              Outcome Evaluation: Patient TR band released last night around 9 pm, no complications noted, no c/o pain. VS stable

## 2023-03-28 ENCOUNTER — OFFICE VISIT (OUTPATIENT)
Dept: FAMILY MEDICINE CLINIC | Facility: CLINIC | Age: 48
End: 2023-03-28
Payer: COMMERCIAL

## 2023-03-28 ENCOUNTER — LAB (OUTPATIENT)
Dept: LAB | Facility: HOSPITAL | Age: 48
End: 2023-03-28
Payer: COMMERCIAL

## 2023-03-28 VITALS
TEMPERATURE: 98 F | SYSTOLIC BLOOD PRESSURE: 120 MMHG | WEIGHT: 172 LBS | DIASTOLIC BLOOD PRESSURE: 69 MMHG | HEIGHT: 69 IN | BODY MASS INDEX: 25.48 KG/M2 | HEART RATE: 83 BPM

## 2023-03-28 DIAGNOSIS — E78.00 HIGH CHOLESTEROL: Chronic | ICD-10-CM

## 2023-03-28 DIAGNOSIS — Z09 HOSPITAL DISCHARGE FOLLOW-UP: Primary | ICD-10-CM

## 2023-03-28 DIAGNOSIS — I25.10 CORONARY ARTERY DISEASE INVOLVING NATIVE CORONARY ARTERY OF NATIVE HEART WITHOUT ANGINA PECTORIS: Chronic | ICD-10-CM

## 2023-03-28 DIAGNOSIS — Z12.5 SCREENING FOR PROSTATE CANCER: ICD-10-CM

## 2023-03-28 DIAGNOSIS — Z00.00 ANNUAL PHYSICAL EXAM: ICD-10-CM

## 2023-03-28 DIAGNOSIS — Z11.59 NEED FOR HEPATITIS C SCREENING TEST: ICD-10-CM

## 2023-03-28 PROBLEM — R00.1 SINUS BRADYCARDIA: Status: RESOLVED | Noted: 2023-03-22 | Resolved: 2023-03-28

## 2023-03-28 PROBLEM — Z98.890 STATUS POST LEFT HEART CATHETERIZATION: Status: RESOLVED | Noted: 2023-03-22 | Resolved: 2023-03-28

## 2023-03-28 PROBLEM — I21.4 NSTEMI (NON-ST ELEVATED MYOCARDIAL INFARCTION): Chronic | Status: ACTIVE | Noted: 2023-03-21

## 2023-03-28 PROBLEM — E78.5 DYSLIPIDEMIA: Status: RESOLVED | Noted: 2023-03-23 | Resolved: 2023-03-28

## 2023-03-28 NOTE — ASSESSMENT & PLAN NOTE
Coronary artery disease is improving with treatment.  Continue current treatment regimen.  Continue current medications.  Cardiac status will be reassessed in 3 months.    He was encouraged to eat a low fat diet and do lipid levels before his next visit.

## 2023-03-28 NOTE — PROGRESS NOTES
"Chief Complaint  Establish Care and Hospital Follow Up Visit (Heart )    Subjective        Freddy Colon presents to [facility]  History of Present Illness  He is here today to establish relations as new patient and for hospital follow-up. He has a FH of CAD. He is single and has one son. He is an engeer. Up until March 23, 2023 the patient was pre much without any past medical history other than HLD.  Subsequently at that point he suffered an NSTEMI.  Now he is diagnosed with coronary artery disease status post stent to the RCA and hyperlipidemia.    His 7 day heart rate is in the 60's.     He denies any CP since his stent.     The patient has no other complaints today and denies chest pain, shortness of breath, weakness, numbness, nausea, vomiting, diarrhea, dizziness or syncopal event.        Objective   Vital Signs:  /69 (BP Location: Right arm, Patient Position: Sitting, Cuff Size: Adult)   Pulse 83   Temp 98 °F (36.7 °C) (Temporal)   Ht 175.3 cm (69\")   Wt 78 kg (172 lb)   BMI 25.40 kg/m²   Estimated body mass index is 25.4 kg/m² as calculated from the following:    Height as of this encounter: 175.3 cm (69\").    Weight as of this encounter: 78 kg (172 lb).             Physical Exam  Vitals reviewed.   Constitutional:       Appearance: Normal appearance. He is well-developed.   HENT:      Head: Normocephalic and atraumatic.      Right Ear: External ear normal.      Left Ear: External ear normal.      Mouth/Throat:      Pharynx: No oropharyngeal exudate.   Eyes:      Conjunctiva/sclera: Conjunctivae normal.      Pupils: Pupils are equal, round, and reactive to light.   Neck:      Vascular: No carotid bruit.   Cardiovascular:      Rate and Rhythm: Normal rate and regular rhythm.      Heart sounds: No murmur heard.    No friction rub. No gallop.   Pulmonary:      Effort: Pulmonary effort is normal.      Breath sounds: Normal breath sounds. No wheezing or rhonchi. "   Abdominal:      General: There is no distension.   Skin:     General: Skin is warm and dry.   Neurological:      Mental Status: He is alert and oriented to person, place, and time.      Cranial Nerves: No cranial nerve deficit.      Motor: No weakness.   Psychiatric:         Mood and Affect: Mood and affect normal.         Behavior: Behavior normal.         Thought Content: Thought content normal.         Judgment: Judgment normal.        Result Review :    CMP    CMP 3/21/23 3/23/23   Glucose 124 (A) 88   BUN 25 (A) 12   Creatinine 0.93 0.87   eGFR 102.6 107.8   Sodium 135 (A) 139   Potassium 4.5 4.0   Chloride 101 104   Calcium 9.1 9.0   Total Protein 6.3    Albumin 4.2    Globulin 2.1    Total Bilirubin 0.3    Alkaline Phosphatase 48    AST (SGOT) 22    ALT (SGPT) 24    Albumin/Globulin Ratio 2.0    BUN/Creatinine Ratio 26.9 (A) 13.8   Anion Gap 11.0 10.2   (A) Abnormal value            CBC    CBC 3/21/23 3/21/23 3/23/23    1842 2241    WBC 10.57 8.89 7.18   RBC 4.48 4.56 4.23   Hemoglobin 13.8 14.0 13.3   Hematocrit 40.4 40.8 38.3   MCV 90.2 89.5 90.5   MCH 30.8 30.7 31.4   MCHC 34.2 34.3 34.7   RDW 12.4 12.6 12.9   Platelets 225 239 196           Lipid Panel    Lipid Panel 3/22/23   Total Cholesterol 228 (A)   Triglycerides 88   HDL Cholesterol 49   VLDL Cholesterol 16   LDL Cholesterol  163 (A)   LDL/HDL Ratio 3.29   (A) Abnormal value                             Assessment and Plan   Diagnoses and all orders for this visit:    1. Hospital discharge follow-up (Primary)  Assessment & Plan:  He is doing well and is CP free since his hospital DC. He is taking his medications as prescribed.       2. Need for hepatitis C screening test  -     Hepatitis C Antibody; Future    3. High cholesterol  Assessment & Plan:  Lipid abnormalities are improving with treatment.  Nutritional counseling was provided. and Pharmacotherapy as ordered.  Lipids will be reassessed in 3 months.    Orders:  -     Lipid panel; Future  -      Comprehensive metabolic panel; Future    4. Annual physical exam  -     CBC & Differential; Future  -     TSH; Future    5. Screening for prostate cancer  -     PSA Screen; Future    6. Coronary artery disease involving native coronary artery of native heart without angina pectoris  Assessment & Plan:  Coronary artery disease is improving with treatment.  Continue current treatment regimen.  Continue current medications.  Cardiac status will be reassessed in 3 months.    He was encouraged to eat a low fat diet and do lipid levels before his next visit.              Follow Up   Return in about 3 months (around 6/28/2023).  Patient was given instructions and counseling regarding his condition or for health maintenance advice. Please see specific information pulled into the AVS if appropriate.

## 2023-03-28 NOTE — ASSESSMENT & PLAN NOTE
He is doing well and is CP free since his hospital DC. He is taking his medications as prescribed.

## 2023-03-29 ENCOUNTER — TELEPHONE (OUTPATIENT)
Dept: CARDIOLOGY | Facility: CLINIC | Age: 48
End: 2023-03-29
Payer: COMMERCIAL

## 2023-03-29 NOTE — TELEPHONE ENCOUNTER
"  Caller: Freddy Colon    Relationship: Self    Best call back number: [phone]    What is the best time to reach you: ANYTIME    Who are you requesting to speak with (clinical staff, provider,  specific staff member): CLINICAL      What was the call regarding: PATIENT WAS TOLD BY DR RAMIREZ AFTER STENT PLACEMENT PROCEDURE THAT HE WOULD RECEIVE A \"STENT CARD\" BEFORE LEAVING THE HOSPITAL.  PATIENT WASN'T GIVEN THE STENT CARD AT THE HOSPITAL.  PLEASE CALL PATIENT REGARDING THIS AS HE WAS TOLD TO CARRY CARD IN CASE OF AN EMERGENCY.    Do you require a callback: YES          "

## 2023-03-30 NOTE — TELEPHONE ENCOUNTER
SHEMAR patient. Instructed patient to call PCU- the floor that d/c the patient to retrieve the car. Patient verbalized understanding and appreciation.

## 2023-04-04 LAB
QT INTERVAL: 395 MS
QT INTERVAL: 399 MS
QT INTERVAL: 404 MS
QT INTERVAL: 457 MS

## 2023-04-06 ENCOUNTER — OFFICE VISIT (OUTPATIENT)
Dept: CARDIOLOGY | Facility: CLINIC | Age: 48
End: 2023-04-06
Payer: COMMERCIAL

## 2023-04-06 VITALS
HEIGHT: 69 IN | HEART RATE: 74 BPM | WEIGHT: 175 LBS | BODY MASS INDEX: 25.92 KG/M2 | DIASTOLIC BLOOD PRESSURE: 70 MMHG | SYSTOLIC BLOOD PRESSURE: 115 MMHG

## 2023-04-06 DIAGNOSIS — I21.4 NSTEMI (NON-ST ELEVATED MYOCARDIAL INFARCTION): Primary | Chronic | ICD-10-CM

## 2023-04-06 DIAGNOSIS — I25.10 CORONARY ARTERY DISEASE INVOLVING NATIVE CORONARY ARTERY OF NATIVE HEART WITHOUT ANGINA PECTORIS: Chronic | ICD-10-CM

## 2023-04-06 DIAGNOSIS — E78.00 HIGH CHOLESTEROL: Chronic | ICD-10-CM

## 2023-04-06 PROCEDURE — 99214 OFFICE O/P EST MOD 30 MIN: CPT

## 2023-04-06 NOTE — ASSESSMENT & PLAN NOTE
Taking medications as instructed without side effects.  No angina or anginal-like symptoms. Continue with current management. Encouraged cardiac rehab.

## 2023-04-06 NOTE — ASSESSMENT & PLAN NOTE
Lipid abnormalities are controlled. Target LDL for this patient is <70. Explained to him the respective contributions of genetics, diet, and exercise to lipid levels and encouraged healthy diet and routine aerobic exercise. Continue current medications.

## 2023-04-06 NOTE — PROGRESS NOTES
Chief Complaint  NSTEMI (non-ST elevated myocardial infarction) and Follow-up    Subjective        History of Present Illness  Freddy Colon presents to [facility] CARDIOLOGY for follow up.  48-year-old  male with a past medical history significant for dyslipidemia and recent NSTEMI status post PCI/ZULLY to the RCA.  Doing very well.  Compliant with his medications.  Reports he is returned to his mountain bike riding without any complaints of chest discomfort or extraordinary dyspnea.  He denies any chest pain, dyspnea, orthopnea, edema, palpitations, syncope.      Past Medical History:   Diagnosis Date   • Myocardial infarction        ALLERGY  No Known Allergies     Past Surgical History:   Procedure Laterality Date   • CARDIAC CATHETERIZATION N/A 3/22/2023    Procedure: Left Heart Cath;  Surgeon: Mikael Mccollum MD;  Location: [facility] CATH INVASIVE LOCATION;  Service: Cardiology;  Laterality: N/A;   • CARDIAC CATHETERIZATION N/A 3/22/2023    Procedure: Percutaneous Coronary Intervention;  Surgeon: Mikael Mccollum MD;  Location: [facility] CATH INVASIVE LOCATION;  Service: Cardiology;  Laterality: N/A;   • INTERVENTIONAL RADIOLOGY PROCEDURE N/A 3/22/2023    Procedure: Intravascular Ultrasound;  Surgeon: Mikael Mccollum MD;  Location: [facility] CATH INVASIVE LOCATION;  Service: Cardiology;  Laterality: N/A;        Social History     Socioeconomic History   • Marital status:    Tobacco Use   • Smoking status: Never     Passive exposure: Never   • Smokeless tobacco: Never   Vaping Use   • Vaping Use: Never used   Substance and Sexual Activity   • Alcohol use: Yes     Alcohol/week: 6.0 standard drinks     Types: 6 Cans of beer per week   • Drug use: Never   • Sexual activity: Defer       History reviewed. No pertinent family history.     Current Outpatient Medications on File Prior to Visit   Medication Sig   • aspirin 81 MG chewable tablet Chew 1 tablet Daily.   •  "rosuvastatin (CRESTOR) 20 MG tablet Take 1 tablet by mouth Every Night.   • ticagrelor (BRILINTA) 90 MG tablet tablet Take 1 tablet by mouth 2 (Two) Times a Day.     Current Facility-Administered Medications on File Prior to Visit   Medication   • nitroglycerin (NITROSTAT) SL tablet 0.4 mg       Objective   Vitals:    04/06/23 0925   BP: 115/70   Pulse: 74   Weight: 79.4 kg (175 lb)   Height: 175.3 cm (69.02\")       Physical Exam  Constitutional:       General: He is awake. He is not in acute distress.     Appearance: Normal appearance.   HENT:      Head: Normocephalic.      Nose: Nose normal. No congestion.   Eyes:      Extraocular Movements: Extraocular movements intact.      Conjunctiva/sclera: Conjunctivae normal.      Pupils: Pupils are equal, round, and reactive to light.   Neck:      Thyroid: No thyromegaly.      Vascular: No JVD.   Cardiovascular:      Rate and Rhythm: Normal rate and regular rhythm.      Chest Wall: PMI is not displaced.      Pulses: Normal pulses.      Heart sounds: Normal heart sounds, S1 normal and S2 normal. No murmur heard.    No friction rub. No gallop. No S3 or S4 sounds.   Pulmonary:      Effort: Pulmonary effort is normal.      Breath sounds: Normal breath sounds. No wheezing, rhonchi or rales.   Abdominal:      General: Bowel sounds are normal.      Palpations: Abdomen is soft.      Tenderness: There is no abdominal tenderness.   Musculoskeletal:      Cervical back: No tenderness.      Right lower leg: No edema.      Left lower leg: No edema.   Lymphadenopathy:      Cervical: No cervical adenopathy.   Skin:     General: Skin is warm and dry.      Capillary Refill: Capillary refill takes less than 2 seconds.      Coloration: Skin is not cyanotic.      Findings: No petechiae or rash.      Nails: There is no clubbing.   Neurological:      General: No focal deficit present.      Mental Status: He is alert and oriented to person, place, and time.   Psychiatric:         Mood and Affect: " Mood normal.         Behavior: Behavior is cooperative.           Result Review     The following data was reviewed by DHIRAJ Ng on 04/06/23.    proBNP   Date Value Ref Range Status   03/21/2023 45.7 0.0 - 450.0 pg/mL Final     CMP    CMP 3/21/23 3/23/23   Glucose 124 (A) 88   BUN 25 (A) 12   Creatinine 0.93 0.87   eGFR 102.6 107.8   Sodium 135 (A) 139   Potassium 4.5 4.0   Chloride 101 104   Calcium 9.1 9.0   Total Protein 6.3    Albumin 4.2    Globulin 2.1    Total Bilirubin 0.3    Alkaline Phosphatase 48    AST (SGOT) 22    ALT (SGPT) 24    Albumin/Globulin Ratio 2.0    BUN/Creatinine Ratio 26.9 (A) 13.8   Anion Gap 11.0 10.2   (A) Abnormal value            CBC w/diff    CBC w/Diff 3/21/23 3/21/23 3/23/23    1842 2241    WBC 10.57 8.89 7.18   RBC 4.48 4.56 4.23   Hemoglobin 13.8 14.0 13.3   Hematocrit 40.4 40.8 38.3   MCV 90.2 89.5 90.5   MCH 30.8 30.7 31.4   MCHC 34.2 34.3 34.7   RDW 12.4 12.6 12.9   Platelets 225 239 196   Neutrophil Rel % 83.0 (A) 81.5 (A)    Immature Granulocyte Rel % 0.3 0.3    Lymphocyte Rel % 12.0 (A) 14.4 (A)    Monocyte Rel % 4.4 (A) 3.6 (A)    Eosinophil Rel % 0.0 (A) 0.1 (A)    Basophil Rel % 0.3 0.1    (A) Abnormal value             Lipid Panel    Lipid Panel 3/22/23   Total Cholesterol 228 (A)   Triglycerides 88   HDL Cholesterol 49   VLDL Cholesterol 16   LDL Cholesterol  163 (A)   LDL/HDL Ratio 3.29   (A) Abnormal value              Results for orders placed during the hospital encounter of 03/21/23    Adult Transthoracic Echo Complete W/ Cont if Necessary Per Protocol    Interpretation Summary  •  Left ventricular systolic function is normal. Calculated left ventricular EF = 62%  •  Left ventricular diastolic function was normal.  •  Estimated right ventricular systolic pressure from tricuspid regurgitation is normal (<35 mmHg).             Assessment & Plan  Diagnoses and all orders for this visit:    1. NSTEMI (non-ST elevated myocardial infarction)  (Primary)  Assessment & Plan:  Taking medications as instructed without side effects.  No angina or anginal-like symptoms. Continue with current management. Encouraged cardiac rehab.         2. Coronary artery disease involving native coronary artery of native heart without angina pectoris  Assessment & Plan:  Continue aspirin and brillinta. Risk factor modification. Healthy diet and routine exercise was encouraged.       3. High cholesterol  Assessment & Plan:  Lipid abnormalities are controlled. Target LDL for this patient is <70. Explained to him the respective contributions of genetics, diet, and exercise to lipid levels and encouraged healthy diet and routine aerobic exercise. Continue current medications.                 Follow Up   Return in about 3 months (around 7/6/2023).    Patient was given instructions and counseling regarding his condition or for health maintenance advice. Please see specific information pulled into the AVS if appropriate.     Elvira Bright, DHIRAJ  04/06/23  09:51 EDT    Dictated Utilizing Dragon Dictation

## 2023-04-06 NOTE — ASSESSMENT & PLAN NOTE
Continue aspirin and brillinta. Risk factor modification. Healthy diet and routine exercise was encouraged.

## 2023-04-28 ENCOUNTER — TELEPHONE (OUTPATIENT)
Dept: CARDIOLOGY | Facility: CLINIC | Age: 48
End: 2023-04-28

## 2023-04-28 NOTE — TELEPHONE ENCOUNTER
Caller: FELISA    Relationship: SELF     Best call back number: [phone]    What is the best time to reach you: ANYTIME    Who are you requesting to speak with (clinical staff, provider,  specific staff member): ANY    Do you know the name of the person who called:     What was the call regarding: PATIENT CALLING IN DUE TO HAVING STENT PLACED BUT PATIENT DIDN'T RECEIVE ID CARD AT THE HOSPITAL OR NOTHING MAILED TO THEM. PLEASE ADVISE HOW TO GO ABOUT GETTING A CARD. THANK YOU!    Do you require a callback: YES

## 2023-07-20 PROBLEM — Z09 HOSPITAL DISCHARGE FOLLOW-UP: Status: RESOLVED | Noted: 2023-03-28 | Resolved: 2023-07-20

## 2023-07-26 ENCOUNTER — TELEPHONE (OUTPATIENT)
Dept: CARDIOLOGY | Facility: CLINIC | Age: 48
End: 2023-07-26
Payer: COMMERCIAL

## 2023-07-26 NOTE — TELEPHONE ENCOUNTER
----- Message from DHIRAJ Souza sent at 7/24/2023 11:38 AM EDT -----    ----- Message -----  From: Mikael Mccollum MD  Sent: 7/21/2023   2:33 PM EDT  To: DHIRAJ Souza    Call patient.  Patient's cholesterol showed perfect good cholesterol and triglycerides.  His bad cholesterol was 79.  This is above the current guidelines of less than 70 with new study showing even better in the 30-50 range.  If we can get the cholesterol injections approved through his insurance would he be willing to try them?

## 2023-07-28 NOTE — TELEPHONE ENCOUNTER
SHEMAR patient. Patient states after careful consideration he would like to wait another 3 months and if he is not successful at reducing his LDL, he will reconsider starting injections.

## 2023-11-07 ENCOUNTER — OFFICE VISIT (OUTPATIENT)
Dept: UROLOGY | Facility: CLINIC | Age: 48
End: 2023-11-07
Payer: COMMERCIAL

## 2023-11-07 VITALS — HEIGHT: 69 IN | WEIGHT: 171 LBS | BODY MASS INDEX: 25.33 KG/M2

## 2023-11-07 DIAGNOSIS — A63.0 CONDYLOMA ACUMINATA: ICD-10-CM

## 2023-11-07 DIAGNOSIS — Z30.09 STERILIZATION CONSULT: Primary | ICD-10-CM

## 2023-11-07 NOTE — PROGRESS NOTES
Chief Complaint: Undesired fertility               History of Present Illness:  Freddy Colon is a 48 y.o. male presents for counseling regarding vasectomy for permanent sterilization. The procedure was discussed with the patient. Freddy Colon is aware that the procedure should be considered irreversible, although at times it can be reversed with success. Risks for the procedure including local effects of swelling, bleeding, pain, the possibility for recanalization, and continued fertility is also possible. Formation of a sperm granuloma also is a possibility. We discussed the procedure, preoperative preparation, and postoperative care. We also discussed the importance of continuing to use contraception post vasectomy, since the patient will not be sterile at that point. We stressed the importance of continuing to use contraception until a follow-up semen specimen is done that shows the absence of sperm. That specimen will normally be obtained eight weeks post-surgery. Freddy Colon is voluntarily requesting the procedure and understands that if it is successful he will be unable to father children.     No anticoagulation, no previous scrotal surgery, no cardiopulmonary history    Alert and oriented x3  No acute distress  Unlabored respirations  Nontender/nondistended  Normal circumcised phallus, bilateral descended testicles without mass.  Bilateral vas deferens palpable  Grossly oriented to person place and time judgment is intact    Patient has 2 areas of condyloma each about 0.5 x 0.5 cm.  Wounds on the left base and the others on the shaft on the right        Very active, cyclist      Patient had cardiac stent placed 3/23   on Eliquis he will have to continue this until 3/24.     Patient did have orchiopexy as a child.  Right side          Assessment and Plan      Consult for sterilization      Plan    Instructions  The patient will schedule a vasectomy if he desires.   Handouts were provided, vasectomy   scanned into the EMR for reference.   Vasectomy is intended to be a permanent form of contraception.   Vasectomy does not produce immediate sterility.   Following vasectomy, another form of contraception is required until vas occlusion is confirmed by post-vasectomy semen analysis (PVSA).   Even after vas occlusion is confirmed, vasectomy is not 100% reliable in preventing pregnancy.   The risk of pregnancy after vasectomy is approximately 1 in 2,000 for men who have no sperm in the semen on post-vasectomy semen analysis showing rare non-motile sperm (RNMS).   Repeat vasectomy is necessary in <1% of vasectomies, provided that a technique for vas occlusion known to have low occlusive failure rate has been used.   Patient's should refrain from ejaculation for approximately 1 week after vasectomy.   Options for fertility after vasectomy reversal and sperm retrieval with in vitro fertilization. These options are not always successful, and are very expensive.   The rates of surgical complications such as symptomatic hematoma and infection are 1-2%  Chronic scrotal pain associated with negative impact on quality of life occurs after vasectomy in about 1-2% of men. Few of these men require additional surgery.   Other permanent and non-permanent alternatives to vasectomy are available.  Patient voiced understanding of the above statements.   Electronically identified patient education materials provided electronically    Patient has decided to schedule a vasectomy.        We will excise his condyloma the same time as his vasectomy.

## 2023-11-09 ENCOUNTER — LAB (OUTPATIENT)
Dept: LAB | Facility: HOSPITAL | Age: 48
End: 2023-11-09
Payer: COMMERCIAL

## 2023-11-09 DIAGNOSIS — R94.6 ABNORMAL THYROID FUNCTION TEST: ICD-10-CM

## 2023-11-09 DIAGNOSIS — E78.00 HIGH CHOLESTEROL: Chronic | ICD-10-CM

## 2023-11-09 LAB
ALBUMIN SERPL-MCNC: 4.8 G/DL (ref 3.5–5.2)
ALBUMIN/GLOB SERPL: 2.4 G/DL
ALP SERPL-CCNC: 49 U/L (ref 39–117)
ALT SERPL W P-5'-P-CCNC: 46 U/L (ref 1–41)
ANION GAP SERPL CALCULATED.3IONS-SCNC: 8 MMOL/L (ref 5–15)
AST SERPL-CCNC: 36 U/L (ref 1–40)
BILIRUB SERPL-MCNC: 0.4 MG/DL (ref 0–1.2)
BUN SERPL-MCNC: 23 MG/DL (ref 6–20)
BUN/CREAT SERPL: 21.3 (ref 7–25)
CALCIUM SPEC-SCNC: 9.9 MG/DL (ref 8.6–10.5)
CHLORIDE SERPL-SCNC: 105 MMOL/L (ref 98–107)
CHOLEST SERPL-MCNC: 171 MG/DL (ref 0–200)
CO2 SERPL-SCNC: 28 MMOL/L (ref 22–29)
CREAT SERPL-MCNC: 1.08 MG/DL (ref 0.76–1.27)
EGFRCR SERPLBLD CKD-EPI 2021: 85.7 ML/MIN/1.73
GLOBULIN UR ELPH-MCNC: 2 GM/DL
GLUCOSE SERPL-MCNC: 126 MG/DL (ref 65–99)
HDLC SERPL-MCNC: 51 MG/DL (ref 40–60)
LDLC SERPL CALC-MCNC: 97 MG/DL (ref 0–100)
LDLC/HDLC SERPL: 1.84 {RATIO}
POTASSIUM SERPL-SCNC: 4.5 MMOL/L (ref 3.5–5.2)
PROT SERPL-MCNC: 6.8 G/DL (ref 6–8.5)
SODIUM SERPL-SCNC: 141 MMOL/L (ref 136–145)
T4 FREE SERPL-MCNC: 1.21 NG/DL (ref 0.93–1.7)
TRIGL SERPL-MCNC: 132 MG/DL (ref 0–150)
TSH SERPL DL<=0.05 MIU/L-ACNC: 4.87 UIU/ML (ref 0.27–4.2)
VLDLC SERPL-MCNC: 23 MG/DL (ref 5–40)

## 2023-11-09 PROCEDURE — 80053 COMPREHEN METABOLIC PANEL: CPT

## 2023-11-09 PROCEDURE — 36415 COLL VENOUS BLD VENIPUNCTURE: CPT

## 2023-11-09 PROCEDURE — 80061 LIPID PANEL: CPT

## 2023-11-09 PROCEDURE — 84443 ASSAY THYROID STIM HORMONE: CPT

## 2023-11-09 PROCEDURE — 84439 ASSAY OF FREE THYROXINE: CPT

## 2023-11-14 ENCOUNTER — OFFICE VISIT (OUTPATIENT)
Dept: FAMILY MEDICINE CLINIC | Facility: CLINIC | Age: 48
End: 2023-11-14
Payer: COMMERCIAL

## 2023-11-14 VITALS
OXYGEN SATURATION: 100 % | TEMPERATURE: 97.8 F | BODY MASS INDEX: 27.02 KG/M2 | WEIGHT: 182.4 LBS | DIASTOLIC BLOOD PRESSURE: 84 MMHG | HEART RATE: 67 BPM | HEIGHT: 69 IN | SYSTOLIC BLOOD PRESSURE: 122 MMHG | RESPIRATION RATE: 18 BRPM

## 2023-11-14 DIAGNOSIS — I25.10 CORONARY ARTERY DISEASE INVOLVING NATIVE CORONARY ARTERY OF NATIVE HEART WITHOUT ANGINA PECTORIS: Chronic | ICD-10-CM

## 2023-11-14 DIAGNOSIS — Z00.00 ANNUAL PHYSICAL EXAM: Primary | ICD-10-CM

## 2023-11-14 DIAGNOSIS — E78.00 HIGH CHOLESTEROL: Chronic | ICD-10-CM

## 2023-11-14 DIAGNOSIS — R74.01 ELEVATED ALT MEASUREMENT: ICD-10-CM

## 2023-11-14 NOTE — ASSESSMENT & PLAN NOTE
The patient is ALT has inched upwards to 46 now.  He was in the 20s back before he started Crestor.  The literature says if his lipid levels will stay under 3 times normal then we can just follow and observe.  I encouraged him to talk to his cardiologist at his follow-up appointment in January about his elevated liver enzymes.  If they continue to creep upwards then he may be a candidate for Repatha.

## 2023-11-14 NOTE — ASSESSMENT & PLAN NOTE
Coronary artery disease is improving with treatment.  Continue current treatment regimen.  Weight loss.  Cardiac status will be reassessed in 6 months.

## 2023-11-14 NOTE — ADDENDUM NOTE
Addended by: ERMA PALACIO on: 11/14/2023 11:50 AM     Modules accepted: Orders, Level of Service

## 2023-11-14 NOTE — PROGRESS NOTES
"Chief Complaint  Annual Exam    Subjective        Freddy Colon presents to [facility]  History of Present Illness  Freddy presents to the clinic for an annual wellness exam. He is here today to establish relations as new patient and for hospital follow-up. He has a FH of CAD. He is single and has one son. He is an engeer. Up until March 23, 2023 the patient was pre much without any past medical history other than HLD.  Subsequently at that point he suffered an NSTEMI.  Now he is diagnosed with coronary artery disease status post stent to the RCA and hyperlipidemia.     His 7 day heart rate is in the 60's.      He denies any CP since his stent.      The patient has no other complaints today and denies chest pain, shortness of breath, weakness, numbness, nausea, vomiting, diarrhea, dizziness or syncopal event.        Objective   Vital Signs:  /84   Pulse 67   Temp 97.8 °F (36.6 °C) (Tympanic)   Resp 18   Ht 175.3 cm (69.02\")   Wt 82.7 kg (182 lb 6.4 oz)   SpO2 100%   BMI 26.92 kg/m²   Estimated body mass index is 26.92 kg/m² as calculated from the following:    Height as of this encounter: 175.3 cm (69.02\").    Weight as of this encounter: 82.7 kg (182 lb 6.4 oz).               Physical Exam  Vitals reviewed.   Constitutional:       Appearance: He is well-developed and overweight.   HENT:      Head: Normocephalic and atraumatic.      Right Ear: External ear normal.      Left Ear: External ear normal.      Mouth/Throat:      Pharynx: No oropharyngeal exudate.   Eyes:      Conjunctiva/sclera: Conjunctivae normal.      Pupils: Pupils are equal, round, and reactive to light.   Neck:      Vascular: No carotid bruit.   Cardiovascular:      Rate and Rhythm: Normal rate and regular rhythm.      Heart sounds: No murmur heard.     No friction rub. No gallop.   Pulmonary:      Effort: Pulmonary effort is normal.      Breath sounds: Normal breath sounds. No wheezing or rhonchi. "   Abdominal:      General: There is no distension.   Skin:     General: Skin is warm and dry.   Neurological:      Mental Status: He is alert and oriented to person, place, and time.      Cranial Nerves: No cranial nerve deficit.      Motor: No weakness.   Psychiatric:         Mood and Affect: Mood and affect normal.         Behavior: Behavior normal.         Thought Content: Thought content normal.         Judgment: Judgment normal.        Result Review :    CMP          3/23/2023    04:40 7/18/2023    08:33 11/9/2023    08:12   CMP   Glucose 88  94  126    BUN 12  20  23    Creatinine 0.87  1.09  1.08    EGFR 107.8  84.8  85.7    Sodium 139  139  141    Potassium 4.0  4.7  4.5    Chloride 104  104  105    Calcium 9.0  10.0  9.9    Total Protein  6.7  6.8    Albumin  4.5  4.8    Globulin  2.2  2.0    Total Bilirubin  0.6  0.4    Alkaline Phosphatase  47  49    AST (SGOT)  30  36    ALT (SGPT)  42  46    Albumin/Globulin Ratio  2.0  2.4    BUN/Creatinine Ratio 13.8  18.3  21.3    Anion Gap 10.2  9.1  8.0      CBC          3/21/2023    18:42 3/21/2023    22:41 3/23/2023    04:40 7/18/2023    08:33   CBC   WBC 10.57  8.89  7.18  5.36    RBC 4.48  4.56  4.23  4.73    Hemoglobin 13.8  14.0  13.3  14.8    Hematocrit 40.4  40.8  38.3  43.6    MCV 90.2  89.5  90.5  92.2    MCH 30.8  30.7  31.4  31.3    MCHC 34.2  34.3  34.7  33.9    RDW 12.4  12.6  12.9  12.4    Platelets 225  239  196  234      Lipid Panel          3/22/2023    06:41 7/18/2023    08:33 11/9/2023    08:12   Lipid Panel   Total Cholesterol 228  149  171    Triglycerides 88  108  132    HDL Cholesterol 49  50  51    VLDL Cholesterol 16  20  23    LDL Cholesterol  163  79  97    LDL/HDL Ratio 3.29  1.55  1.84      TSH          7/18/2023    08:33 11/9/2023    08:12   TSH   TSH 6.940  4.870                   Assessment and Plan   Diagnoses and all orders for this visit:    1. Annual physical exam (Primary)  Assessment & Plan:  The patient was encouraged to  always wear their seatbelt and never text and drive.  They were encouraged to get 7 to 8 hours sleep at night.  They were encouraged to exercise on a regular basis.  Screening labs were reviewed at today's visit and manage according to findings.        2. Elevated ALT measurement  Assessment & Plan:  The patient is ALT has inched upwards to 46 now.  He was in the 20s back before he started Crestor.  The literature says if his lipid levels will stay under 3 times normal then we can just follow and observe.  I encouraged him to talk to his cardiologist at his follow-up appointment in January about his elevated liver enzymes.  If they continue to creep upwards then he may be a candidate for Repatha.    Orders:  -     Comprehensive metabolic panel; Future    3. High cholesterol  Assessment & Plan:  Lipid abnormalities are improving with treatment.  Nutritional counseling was provided. and Pharmacotherapy as ordered.  Lipids will be reassessed in 6 months.    Orders:  -     Lipid panel; Future    4. Coronary artery disease involving native coronary artery of native heart without angina pectoris  Assessment & Plan:  Coronary artery disease is improving with treatment.  Continue current treatment regimen.  Weight loss.  Cardiac status will be reassessed in 6 months.               Follow Up   Return in about 6 months (around 5/14/2024).  Patient was given instructions and counseling regarding his condition or for health maintenance advice. Please see specific information pulled into the AVS if appropriate.

## 2024-01-19 ENCOUNTER — OFFICE VISIT (OUTPATIENT)
Dept: CARDIOLOGY | Facility: CLINIC | Age: 49
End: 2024-01-19
Payer: COMMERCIAL

## 2024-01-19 VITALS
HEART RATE: 74 BPM | BODY MASS INDEX: 26.9 KG/M2 | HEIGHT: 69 IN | WEIGHT: 181.6 LBS | SYSTOLIC BLOOD PRESSURE: 135 MMHG | DIASTOLIC BLOOD PRESSURE: 83 MMHG

## 2024-01-19 DIAGNOSIS — E78.2 MIXED HYPERLIPIDEMIA: ICD-10-CM

## 2024-01-19 DIAGNOSIS — I25.10 CORONARY ARTERY DISEASE INVOLVING NATIVE CORONARY ARTERY OF NATIVE HEART WITHOUT ANGINA PECTORIS: Primary | ICD-10-CM

## 2024-01-19 PROCEDURE — 99214 OFFICE O/P EST MOD 30 MIN: CPT | Performed by: INTERNAL MEDICINE

## 2024-01-19 NOTE — PROGRESS NOTES
Chief Complaint  non-ST elevated myocardial infarction    Subjective        Freddy Colon presents to [facility] CARDIOLOGY  History of present illness:    Patient states overall he is still doing very well.  He is cycling up to 80 miles per week with no chest pain.  He denies any bleeding problems, palpitations or edema.      Past Medical History:   Diagnosis Date    Myocardial infarction          Past Surgical History:   Procedure Laterality Date    CARDIAC CATHETERIZATION N/A 3/22/2023    Procedure: Left Heart Cath;  Surgeon: Mikael Mccollum MD;  Location: [facility] CATH INVASIVE LOCATION;  Service: Cardiology;  Laterality: N/A;    CARDIAC CATHETERIZATION N/A 3/22/2023    Procedure: Percutaneous Coronary Intervention;  Surgeon: Mikael Mccollum MD;  Location: [facility] CATH INVASIVE LOCATION;  Service: Cardiology;  Laterality: N/A;    INTERVENTIONAL RADIOLOGY PROCEDURE N/A 3/22/2023    Procedure: Intravascular Ultrasound;  Surgeon: Mikael Mccollum MD;  Location: [facility] CATH INVASIVE LOCATION;  Service: Cardiology;  Laterality: N/A;          Social History     Socioeconomic History    Marital status:    Tobacco Use    Smoking status: Never     Passive exposure: Never    Smokeless tobacco: Never   Vaping Use    Vaping Use: Never used   Substance and Sexual Activity    Alcohol use: Yes     Alcohol/week: 6.0 standard drinks of alcohol     Types: 6 Cans of beer per week    Drug use: Never    Sexual activity: Defer         History reviewed. No pertinent family history.       No Known Allergies         Current Outpatient Medications:     aspirin 81 MG chewable tablet, Chew 1 tablet Daily., Disp: 90 tablet, Rfl: 3    rosuvastatin (CRESTOR) 20 MG tablet, Take 1 tablet by mouth Every Night., Disp: 90 tablet, Rfl: 3    ticagrelor (BRILINTA) 90 MG tablet tablet, Take 1 tablet by mouth 2 (Two) Times a Day., Disp: 180 tablet, Rfl: 3      ROS:  Cardiac review of systems  "negative.    Objective     /83   Pulse 74   Ht 175.3 cm (69\")   Wt 82.4 kg (181 lb 9.6 oz)   BMI 26.82 kg/m²       General Appearance:   well developed  well nourished  HENT:   oropharynx moist  lips not cyanotic  Respiratory:  no respiratory distress  normal breath sounds  no rales  Cardiovascular:  no jugular venous distention  regular rhythm  S1 normal, S2 normal  no S3, no S4   no murmur  no rub, no thrill  No carotid bruit  pedal pulses normal  lower extremity edema: none    Musculoskeletal:  no clubbing of fingers.   normocephalic, head atraumatic  Skin:   warm, dry  Psychiatric:  judgement and insight appropriate  normal mood and affect    ECHO:  Results for orders placed during the hospital encounter of 23    Adult Transthoracic Echo Complete W/ Cont if Necessary Per Protocol    Interpretation Summary    Left ventricular systolic function is normal. Calculated left ventricular EF = 62%    Left ventricular diastolic function was normal.    Estimated right ventricular systolic pressure from tricuspid regurgitation is normal (<35 mmHg).    STRESS:    CATH:  Results for orders placed during the hospital encounter of 23    Cardiac Catheterization/Vascular Study    [facility]  CARDIAC CATHETERIZATION PROCEDURE REPORT    Patient: Freddy Colon  : 1975  MRN: [id number]  Procedure Date: 23    Referring Physician:  Goyo Mart MD    Interventional Cardiologist:  Mikael Mccollum MD    Indication:  Chest pain  Non-STEMI    Clinical Presentation:  48-year-old who presented with exertional chest pain.  It then occurred at rest.  He had elevated troponins with a significant delta.    Procedure performed:  Right radial arterial sheath placement  Left Heart Catheterization  Selective coronary Angiography  IVUS evaluation of the RCA  Successful percutaneous coronary intervention to mid RCA with a 3.0 x 38 mm Xience drug-eluting stent postdilated in the mid and proximal " segment up to 3.5 and 3.9 mm range.  Moderate conscious sedation  TR Terumo band device placement for hemostasis    Details of the procedure:  Informed consent was obtained with an explanation of the risks, benefits and alternatives of the procedure. The patient was brought to the Cardiac Catheterization Laboratory in a fasting state.  The patient was prepped and draped in a standard, sterile fashion. Moderate conscious sedation with Fentanyl and Versed was administered by the circulating nurse. Lidocaine 2% was used to anesthetize the right radial artery and a 5/6 British glidesheath was placed via modified Seldinger technique.  TIG catheter was used to selectively engage both coronary arteries.  Multiple scintigraphic views were obtained.  The Tygh catheter was used to cross the aortic valve.  Left heart hemodynamics and left ventriculogram was obtained.        Findings:  1. Coronary Artery Anatomy:  Dominance: Right  Left Main: Short vessel, gives off LAD and circumflex.  No angiographic disease.  Left Anterior Descending: Moderate size vessel giving off diagonals and septal perforators.  There is mild diffuse disease in the proximal to mid segment.  Left Circumflex Artery: Moderate size vessel.  There is mild ostial narrowing at 10 to 20%.  Mild disease is noted at the bifurcation of an OM/AV groove circumflex.  Right Coronary Artery: Moderate size vessel dominant for the posterior circulation.  There is moderate to severe diffuse disease in the mid segment with a focal stenosis up to 90%.    2. Hemodynamics:  The opening aortic pressure is 110/70 mmHg.  The left ventricular end-diastolic pressure is 12 mmHg.  There was no gradient across aortic valve on pullback          4. Percutaneous Intervention:  Location: Mid RCA  Treatment: 3.0 x 38 mm Xience drug-eluting stent postdilated after IVUS evaluation up to 3.5 in the mid segment and close to 3.9 mm in the proximal segment.  Pre-stenosis: 90%  Post-stenosis:  Near 0%  ALEXA Flow Pre: 3  ALEXA Flow Post: 3  Bifurcation: No significant  Severe Calcium: No  Dissection: No            Details of angioplasty:    Heparin was used for anticoagulation throughout the procedure with frequent checking of ACT.  A JR4 guide catheter was used to selectively cannulate the RCA.  A BMW wire was used to selectively cannulate the RCA with mild manipulation but no major difficulty.  Initially a 2.5 x 20 mm noncompliant balloon was dilated in the proximal to mid RCA.  This balloon was unable to cross the severe stenosis that was pretty much occluded flow with the wire.  A 1.0 x 10 mm noncompliant balloon was used to dilate the severe stenosis.  Then the 2.5 x 20 mm noncompliant balloon was reexpanded across this area.  IVUS evaluation was done of the RCA.  A 3.0 x 38 mm Xience drug-eluting stent was deployed in the proximal to mid RCA up to 14 jamie.  With IVUS evaluation the mid stent was postdilated to 3.5 mm and the proximal area of the stent was postdilated up to 3.9 mm.  Follow-up angiography revealed excellent results.  No dissections or wire perforations were noted.  The patient was given 324 ASA and 180 mg of Brilinta during the procedure.    At the end of the procedure, the right radial arterial sheath was removed and a TR band was applied for hemostasis.  Adequate hemostasis was achieved.  Patient tolerated procedure well without any complications.  The results of the test were explained in detail to the patient.    Cumulative fluoroscopy time: 15.8 min    Cumulative air kerma: 606 mGy    Total amount of contrast used: See report ml of Isovue      Complications:  None.    Estimated Blood Loss:  Minimal.    Conclusions:  Severe diffuse disease in the proximal to mid RCA with focal segment up to 90%.  Status post 3.0 x 38 mm Xience drug-eluting stent postdilated in the mid segment up to 3.5 mm and the proximal segment up to 3.9 after IVUS evaluation.  Mild nonobstructive disease in the  left system.  Normal LVEDP    Recommendations:  Continue aspirin and Brilinta.  Goal-directed medical therapy.  Risk factor reduction.    Mikael Mccollum MD    03/22/23  15:41 EDT    BMP:     Glucose   Date Value Ref Range Status   11/09/2023 126 (H) 65 - 99 mg/dL Final     BUN   Date Value Ref Range Status   11/09/2023 23 (H) 6 - 20 mg/dL Final     Creatinine   Date Value Ref Range Status   11/09/2023 1.08 0.76 - 1.27 mg/dL Final     Sodium   Date Value Ref Range Status   11/09/2023 141 136 - 145 mmol/L Final     Potassium   Date Value Ref Range Status   11/09/2023 4.5 3.5 - 5.2 mmol/L Final     Chloride   Date Value Ref Range Status   11/09/2023 105 98 - 107 mmol/L Final     CO2   Date Value Ref Range Status   11/09/2023 28.0 22.0 - 29.0 mmol/L Final     Calcium   Date Value Ref Range Status   11/09/2023 9.9 8.6 - 10.5 mg/dL Final     BUN/Creatinine Ratio   Date Value Ref Range Status   11/09/2023 21.3 7.0 - 25.0 Final     Anion Gap   Date Value Ref Range Status   11/09/2023 8.0 5.0 - 15.0 mmol/L Final     eGFR   Date Value Ref Range Status   11/09/2023 85.7 >60.0 mL/min/1.73 Final     LIPIDS:  Total Cholesterol   Date Value Ref Range Status   11/09/2023 171 0 - 200 mg/dL Final     Triglycerides   Date Value Ref Range Status   11/09/2023 132 0 - 150 mg/dL Final     HDL Cholesterol   Date Value Ref Range Status   11/09/2023 51 40 - 60 mg/dL Final     LDL Cholesterol    Date Value Ref Range Status   11/09/2023 97 0 - 100 mg/dL Final     VLDL Cholesterol   Date Value Ref Range Status   11/09/2023 23 5 - 40 mg/dL Final     LDL/HDL Ratio   Date Value Ref Range Status   11/09/2023 1.84  Final         Procedures             ASSESSMENT:  Diagnoses and all orders for this visit:    1. Coronary artery disease involving native coronary artery of native heart without angina pectoris (Primary)    2. Mixed hyperlipidemia         PLAN:    1.  Continue the aspirin and Brilinta.  I did tell him he could stop the Brilinta  3/21/2024 which would be 1 year from the stent.  2.  Blood pressures under good control.  3.  Continue the Crestor.  Patient's LDL was not at goal the last time we saw him.  He does though bring in a check that was done 8/14/2023 with LDL 55, HDL 56, and triglycerides 101.  I again talked to him about the cholesterol injections.  He wants to try diet and exercise and have the cholesterol rechecked by his primary care before seeing us next time.  I told him I think this is fine.  I did tell him due to his young age I want his modifiable risk factors are under excellent control.  4.  Encouraged the patient to continue to remain active and call us if any exertional chest pain.      Return in about 6 months (around 7/19/2024).     Patient was given instructions and counseling regarding his condition or for health maintenance advice. Please see specific information pulled into the AVS if appropriate.         Mikael Mccollum MD   1/19/2024  13:44 EST

## 2024-04-04 ENCOUNTER — TELEPHONE (OUTPATIENT)
Dept: CARDIOLOGY | Facility: CLINIC | Age: 49
End: 2024-04-04
Payer: COMMERCIAL

## 2024-04-04 RX ORDER — ASPIRIN 81 MG/1
81 TABLET, CHEWABLE ORAL DAILY
Qty: 90 TABLET | Refills: 3 | Status: SHIPPED | OUTPATIENT
Start: 2024-04-04

## 2024-04-04 RX ORDER — ROSUVASTATIN CALCIUM 20 MG/1
20 TABLET, COATED ORAL NIGHTLY
Qty: 90 TABLET | Refills: 3 | Status: SHIPPED | OUTPATIENT
Start: 2024-04-04

## 2024-04-04 NOTE — TELEPHONE ENCOUNTER
Caller: Felisa Colon    Relationship: Self    Best call back number: [phone]     What is the best time to reach you: ANY    Who are you requesting to speak with (clinical staff, provider,  specific staff member): CLINICAL    Do you know the name of the person who called: FELISA    What was the call regarding: PT STATES THAT [facility] IS NEEDING AN AUTHORIZATION FROM DR. RAMIREZ IN ORDER TO FILL HIS PRESCRIPTIONS    Is it okay if the provider responds through MyChart: PLEASE CALL

## 2024-04-04 NOTE — TELEPHONE ENCOUNTER
SHEMAR PT.  PT IS NEEDING REFILLS ON ROSUVASTATIN AND ASPIRIN TO BE SENT TO [facility].  PT STATED HE IS NOT TAKING BRILINTA ANYMORE.  NOT PREVIOUS PRESCRIBER.

## 2024-04-12 ENCOUNTER — PROCEDURE VISIT (OUTPATIENT)
Dept: UROLOGY | Facility: CLINIC | Age: 49
End: 2024-04-12
Payer: COMMERCIAL

## 2024-04-12 DIAGNOSIS — Z30.2 STERILIZATION: Primary | ICD-10-CM

## 2024-04-12 DIAGNOSIS — A63.0 CONDYLOMA: ICD-10-CM

## 2024-04-12 NOTE — PROGRESS NOTES
Vasectomy Procedure    Procedure:     Vasectomy   Fulguration of penile and scrotal condyloma    Pre-procedure Diagnosis:     Undesired Fertility  Condyloma acuminata    Post-procedure Diagnosis: Same    Surgeon: Fermín Hood MD    Anesthesia: Local, 10 cc of 1% Lidocaine    Indications  with undesired fertility, who presents today for vasectomy for permanent contraception.     Procedure Details:     The patient was appropriately identified, brought into the procedure room, and placed supine on the procedure table. A time was undertaken documenting the correct patient, site and procedure. His scrotum was prepped and draped in the standard sterile fashion. We first approached the left  vas deferens. This was identified,  from the spermatic cord structures, and brought to the anterolateral aspect of the hemiscrotum. The local anaesthetic solution was injected and once an adequate level of local anesthesia was achieved, a scalpel was used to make a 1 cm incision in the skin overlying the vas deferens at the midline. A sharp hemostat was used to dissect the level of the vas deferens and on both of its sides, allowing for ring forceps to be introduced and grasp the vas deferens and externalize it through the skin incision. We then used a combination of sharp and blunt dissection to release the exposed vasal segment from all surrounding tissues. An approximately 1 cm piece of vas deferens was then sharply excised and removed. One blade of a sharp hemostat was used to probe each end of the severed vas deferens, confirming the presence of a vasal lumen. Electrocautery was then used to fulgurate both cut surfaces of the severed vas deferens. The abdominal side of the vas deferens was then placed underneath some perivasal tissues that were closed above it, using a figure-of-eight 3-0 chromic stitch, thus placing the two edges of the severed vas deferens in different tissue planes. Hemostasis was confirmed and the  severed vas deferens was allowed to drop back into the scrotum.  We then turned our attention to the     Right side.    Patient had a atrophic right testicle that was high riding.  I did work for several minutes  and made a small incision and tried to find the best difference for several minutes.  Could not find a vas deferens on the right side.      Patient also with a 5 mm condyloma left of the penile shaft on the scrotum.  This was anesthetized with lidocaine plain and fulgurated.  He also had a 0.6 cm condyloma on the right side of the penis.  This was also fulgurated without issue.        Wound was dressed with bacitracin ointment and folded 4x4 gauze. The patient tolerated the procedure well and there were no intraoperative or immediate postoperative complications.     No intraprocedural complications    Blood loss 000    Plan:    1. Continue contraception until negative sperm analysis. Semen count in 10 weeks  2. Warning signs of infection were reviewed.   3. Patient is taken home by  with written home care instructions.  Bedrest X 48 hrs, Ice pack every 3 hours for 24 hrs.    Call the clinic if excessive pain, bleeding or swelling.  Light duty for 2 weeks    Patient voiced understanding.    Electronically Signed by Fermín Hood MD on 04/12/2024

## 2024-05-10 ENCOUNTER — LAB (OUTPATIENT)
Dept: LAB | Facility: HOSPITAL | Age: 49
End: 2024-05-10
Payer: COMMERCIAL

## 2024-05-10 DIAGNOSIS — E78.00 HIGH CHOLESTEROL: Chronic | ICD-10-CM

## 2024-05-10 DIAGNOSIS — R74.01 ELEVATED ALT MEASUREMENT: ICD-10-CM

## 2024-05-10 LAB
ALBUMIN SERPL-MCNC: 4.6 G/DL (ref 3.5–5.2)
ALBUMIN/GLOB SERPL: 1.9 G/DL
ALP SERPL-CCNC: 52 U/L (ref 39–117)
ALT SERPL W P-5'-P-CCNC: 58 U/L (ref 1–41)
ANION GAP SERPL CALCULATED.3IONS-SCNC: 11.7 MMOL/L (ref 5–15)
AST SERPL-CCNC: 37 U/L (ref 1–40)
BILIRUB SERPL-MCNC: 0.5 MG/DL (ref 0–1.2)
BUN SERPL-MCNC: 20 MG/DL (ref 6–20)
BUN/CREAT SERPL: 19 (ref 7–25)
CALCIUM SPEC-SCNC: 10.1 MG/DL (ref 8.6–10.5)
CHLORIDE SERPL-SCNC: 101 MMOL/L (ref 98–107)
CHOLEST SERPL-MCNC: 192 MG/DL (ref 0–200)
CO2 SERPL-SCNC: 26.3 MMOL/L (ref 22–29)
CREAT SERPL-MCNC: 1.05 MG/DL (ref 0.76–1.27)
EGFRCR SERPLBLD CKD-EPI 2021: 88.1 ML/MIN/1.73
GLOBULIN UR ELPH-MCNC: 2.4 GM/DL
GLUCOSE SERPL-MCNC: 98 MG/DL (ref 65–99)
HDLC SERPL-MCNC: 47 MG/DL (ref 40–60)
LDLC SERPL CALC-MCNC: 127 MG/DL (ref 0–100)
LDLC/HDLC SERPL: 2.65 {RATIO}
POTASSIUM SERPL-SCNC: 5.1 MMOL/L (ref 3.5–5.2)
PROT SERPL-MCNC: 7 G/DL (ref 6–8.5)
SODIUM SERPL-SCNC: 139 MMOL/L (ref 136–145)
TRIGL SERPL-MCNC: 102 MG/DL (ref 0–150)
VLDLC SERPL-MCNC: 18 MG/DL (ref 5–40)

## 2024-05-10 PROCEDURE — 80061 LIPID PANEL: CPT

## 2024-05-10 PROCEDURE — 80053 COMPREHEN METABOLIC PANEL: CPT

## 2024-05-10 PROCEDURE — 36415 COLL VENOUS BLD VENIPUNCTURE: CPT

## 2024-05-14 ENCOUNTER — LAB (OUTPATIENT)
Dept: LAB | Facility: HOSPITAL | Age: 49
End: 2024-05-14
Payer: COMMERCIAL

## 2024-05-14 ENCOUNTER — OFFICE VISIT (OUTPATIENT)
Dept: FAMILY MEDICINE CLINIC | Facility: CLINIC | Age: 49
End: 2024-05-14
Payer: COMMERCIAL

## 2024-05-14 VITALS
SYSTOLIC BLOOD PRESSURE: 115 MMHG | DIASTOLIC BLOOD PRESSURE: 79 MMHG | RESPIRATION RATE: 18 BRPM | HEIGHT: 69 IN | HEART RATE: 68 BPM | BODY MASS INDEX: 26.36 KG/M2 | OXYGEN SATURATION: 97 % | WEIGHT: 178 LBS

## 2024-05-14 DIAGNOSIS — R74.8 ELEVATED LIVER ENZYMES: Primary | ICD-10-CM

## 2024-05-14 DIAGNOSIS — I25.10 CORONARY ARTERY DISEASE INVOLVING NATIVE CORONARY ARTERY OF NATIVE HEART WITHOUT ANGINA PECTORIS: Chronic | ICD-10-CM

## 2024-05-14 DIAGNOSIS — R74.8 ELEVATED LIVER ENZYMES: ICD-10-CM

## 2024-05-14 DIAGNOSIS — E78.00 HIGH CHOLESTEROL: Chronic | ICD-10-CM

## 2024-05-14 PROBLEM — Z00.00 ANNUAL PHYSICAL EXAM: Status: RESOLVED | Noted: 2023-03-22 | Resolved: 2024-05-14

## 2024-05-14 LAB
CERULOPLASMIN SERPL-MCNC: 23 MG/DL (ref 16–31)
FERRITIN SERPL-MCNC: 131 NG/ML (ref 30–400)
HAV IGM SERPL QL IA: NORMAL
HBV CORE IGM SERPL QL IA: NORMAL
HBV SURFACE AG SERPL QL IA: NORMAL
HCV AB SER QL: NORMAL
INR PPP: 0.92 (ref 0.86–1.15)
IRON 24H UR-MRATE: 93 MCG/DL (ref 59–158)
IRON SATN MFR SERPL: 22 % (ref 20–50)
PROTHROMBIN TIME: 12.5 SECONDS (ref 11.8–14.9)
TIBC SERPL-MCNC: 429 MCG/DL (ref 298–536)
TRANSFERRIN SERPL-MCNC: 288 MG/DL (ref 200–360)

## 2024-05-14 PROCEDURE — 85610 PROTHROMBIN TIME: CPT

## 2024-05-14 PROCEDURE — 86038 ANTINUCLEAR ANTIBODIES: CPT

## 2024-05-14 PROCEDURE — 80074 ACUTE HEPATITIS PANEL: CPT

## 2024-05-14 PROCEDURE — 86381 MITOCHONDRIAL ANTIBODY EACH: CPT

## 2024-05-14 PROCEDURE — 82390 ASSAY OF CERULOPLASMIN: CPT

## 2024-05-14 PROCEDURE — 83540 ASSAY OF IRON: CPT

## 2024-05-14 PROCEDURE — 84466 ASSAY OF TRANSFERRIN: CPT

## 2024-05-14 PROCEDURE — 36415 COLL VENOUS BLD VENIPUNCTURE: CPT

## 2024-05-14 PROCEDURE — 99214 OFFICE O/P EST MOD 30 MIN: CPT | Performed by: FAMILY MEDICINE

## 2024-05-14 PROCEDURE — 82728 ASSAY OF FERRITIN: CPT

## 2024-05-14 PROCEDURE — 86015 ACTIN ANTIBODY EACH: CPT

## 2024-05-14 NOTE — ASSESSMENT & PLAN NOTE
The patient's cholesterol is crept up some.  Low fat diet was reinforced today.  We did discuss possibly going up on his Crestor from 20 to 40 mg daily.  He is to work on his diet and we will going repeated in 3 months.

## 2024-05-14 NOTE — ASSESSMENT & PLAN NOTE
Lipid abnormalities are worsening    Plan:  Continue same medication/s without change.      Discussed medication dosage, use, side effects, and goals of treatment in detail.    Counseled patient on lifestyle modifications to help control hyperlipidemia.   Cholesterol lowering dietary information shared with patient.    Patient Treatment Goals:   LDL goal is less than 70    Followup in 6 months.

## 2024-05-14 NOTE — PROGRESS NOTES
"Chief Complaint  Hyperlipidemia and Coronary Artery Disease    Subjective        Freddy Colon presents to [facility]  History of Present Illness  Freddy presents to the clinic for management of his chronic medical conditions. He has a FH of CAD. He is single and has one son. He is an engeer. Up until March 23, 2023 the patient was pretty much without any past medical history other than HLD.  Subsequently at that point he suffered an NSTEMI.  Now he is diagnosed with coronary artery disease status post stent to the RCA and hyperlipidemia.     His 7 day heart rate is in the 60's.      He does have occasional chest discomfort but says he believes it is due to dyspepsia and says usually it is resolved by burping.    The patient has no other complaints today and denies chest pain, shortness of breath, weakness, numbness, nausea, vomiting, diarrhea, dizziness or syncopal event.        Objective   Vital Signs:  /79 (BP Location: Left arm, Patient Position: Sitting, Cuff Size: Adult)   Pulse 68   Resp 18   Ht 175.3 cm (69\")   Wt 80.7 kg (178 lb)   SpO2 97%   BMI 26.29 kg/m²   Estimated body mass index is 26.29 kg/m² as calculated from the following:    Height as of this encounter: 175.3 cm (69\").    Weight as of this encounter: 80.7 kg (178 lb).               Physical Exam  Vitals reviewed.   Constitutional:       Appearance: He is well-developed and overweight.   HENT:      Head: Normocephalic and atraumatic.      Right Ear: External ear normal.      Left Ear: External ear normal.      Mouth/Throat:      Pharynx: No oropharyngeal exudate.   Eyes:      Conjunctiva/sclera: Conjunctivae normal.      Pupils: Pupils are equal, round, and reactive to light.   Neck:      Vascular: No carotid bruit.   Cardiovascular:      Rate and Rhythm: Normal rate and regular rhythm.      Heart sounds: No murmur heard.     No friction rub. No gallop.   Pulmonary:      Effort: Pulmonary effort is " normal.      Breath sounds: Normal breath sounds. No wheezing or rhonchi.   Abdominal:      General: There is no distension.   Skin:     General: Skin is warm and dry.   Neurological:      Mental Status: He is alert and oriented to person, place, and time.      Cranial Nerves: No cranial nerve deficit.      Motor: No weakness.   Psychiatric:         Mood and Affect: Mood and affect normal.         Behavior: Behavior normal.         Thought Content: Thought content normal.         Judgment: Judgment normal.        Result Review :      CMP          7/18/2023    08:33 11/9/2023    08:12 5/10/2024    08:13   CMP   Glucose 94  126  98    BUN 20  23  20    Creatinine 1.09  1.08  1.05    EGFR 84.8  85.7  88.1    Sodium 139  141  139    Potassium 4.7  4.5  5.1    Chloride 104  105  101    Calcium 10.0  9.9  10.1    Total Protein 6.7  6.8  7.0    Albumin 4.5  4.8  4.6    Globulin 2.2  2.0  2.4    Total Bilirubin 0.6  0.4  0.5    Alkaline Phosphatase 47  49  52    AST (SGOT) 30  36  37    ALT (SGPT) 42  46  58    Albumin/Globulin Ratio 2.0  2.4  1.9    BUN/Creatinine Ratio 18.3  21.3  19.0    Anion Gap 9.1  8.0  11.7      CBC          7/18/2023    08:33   CBC   WBC 5.36    RBC 4.73    Hemoglobin 14.8    Hematocrit 43.6    MCV 92.2    MCH 31.3    MCHC 33.9    RDW 12.4    Platelets 234      Lipid Panel          7/18/2023    08:33 11/9/2023    08:12 5/10/2024    08:13   Lipid Panel   Total Cholesterol 149  171  192    Triglycerides 108  132  102    HDL Cholesterol 50  51  47    VLDL Cholesterol 20  23  18    LDL Cholesterol  79  97  127    LDL/HDL Ratio 1.55  1.84  2.65      TSH          7/18/2023    08:33 11/9/2023    08:12   TSH   TSH 6.940  4.870                   Assessment and Plan     Diagnoses and all orders for this visit:    1. Elevated liver enzymes (Primary)  Comments:  Gastroenterology was consulted today.  It was felt warranted to order labs and liver ultrasound to rule out abnormalities of the liver.  Orders:  -      SHAYY; Future  -     Protime-INR; Future  -     Ceruloplasmin; Future  -     Iron and TIBC; Future  -     Ferritin; Future  -     Anti-Smooth Muscle Antibody Titer; Future  -     Mitochondrial Antibodies, M2; Future  -     Hepatitis panel, acute; Future  -     US Liver; Future    2. Coronary artery disease involving native coronary artery of native heart without angina pectoris  Assessment & Plan:  The patient's cholesterol is crept up some.  Low fat diet was reinforced today.  We did discuss possibly going up on his Crestor from 20 to 40 mg daily.  He is to work on his diet and we will going repeated in 3 months.      3. High cholesterol  Assessment & Plan:   Lipid abnormalities are worsening    Plan:  Continue same medication/s without change.      Discussed medication dosage, use, side effects, and goals of treatment in detail.    Counseled patient on lifestyle modifications to help control hyperlipidemia.   Cholesterol lowering dietary information shared with patient.    Patient Treatment Goals:   LDL goal is less than 70    Followup in 6 months.    Orders:  -     Lipid panel; Future             Follow Up     Return in about 6 months (around 11/14/2024).  Patient was given instructions and counseling regarding his condition or for health maintenance advice. Please see specific information pulled into the AVS if appropriate.

## 2024-05-16 LAB
ANA SER QL: NEGATIVE
MITOCHONDRIA M2 IGG SER-ACNC: 39.5 UNITS (ref 0–20)
SMA IGG SER-ACNC: 8 UNITS (ref 0–19)

## 2024-05-17 ENCOUNTER — TELEPHONE (OUTPATIENT)
Dept: FAMILY MEDICINE CLINIC | Facility: CLINIC | Age: 49
End: 2024-05-17

## 2024-05-17 NOTE — TELEPHONE ENCOUNTER
Caller: Freddy Colon    Relationship to patient: Self    Best call back number: [phone]    Patient is needing: PATIENT RETURNING MISSED CALL FROM THE OFFICE. PATIENT STATES HE MISSED THE CALL YESTERDAY AFTERNOON AND THOUGHT IT MAY BE REGARDING TEST RESULTS.

## 2024-05-22 DIAGNOSIS — R74.01 ELEVATED ALT MEASUREMENT: Primary | ICD-10-CM

## 2024-06-02 ENCOUNTER — HOSPITAL ENCOUNTER (OUTPATIENT)
Dept: ULTRASOUND IMAGING | Facility: HOSPITAL | Age: 49
Discharge: HOME | End: 2024-06-02
Admitting: FAMILY MEDICINE
Payer: COMMERCIAL

## 2024-06-02 DIAGNOSIS — R74.8 ELEVATED LIVER ENZYMES: ICD-10-CM

## 2024-06-02 PROCEDURE — 76705 ECHO EXAM OF ABDOMEN: CPT

## 2024-06-03 ENCOUNTER — TELEPHONE (OUTPATIENT)
Dept: FAMILY MEDICINE CLINIC | Facility: CLINIC | Age: 49
End: 2024-06-03

## 2024-06-03 NOTE — TELEPHONE ENCOUNTER
Ultrasound results have not been signed yet as of 1040 this morning. Once report is signed they will send results to Dr Siegel and we can contact patient

## 2024-06-03 NOTE — TELEPHONE ENCOUNTER
Caller: Freddy Colon    Relationship: Self    Best call back number: [phone]     Caller requesting test results: SELF    What test was performed: ULTRASOUND    When was the test performed: 6/2/24    Where was the test performed: [facility]    Additional notes: PATIENT STATES HE WOULD LIKE DR PALACIO TO CALL HIM BACK TO REVIEW THE RESULT WITH HIM.

## 2024-06-06 NOTE — TELEPHONE ENCOUNTER
Patient aware of ultrasound results via phone call     Patient did states he is no longer taking the statin and he is trying a more healthy lifestyle. If you have any concerns he is more than happy to discuss     Also he cancelled appt with GI since liver US came back normal.

## 2024-06-20 ENCOUNTER — TELEPHONE (OUTPATIENT)
Dept: UROLOGY | Facility: CLINIC | Age: 49
End: 2024-06-20
Payer: COMMERCIAL

## 2024-06-20 NOTE — TELEPHONE ENCOUNTER
Caller: Freddy Colon    Relationship to patient: Self    Best call back number: [phone]    Patient is needing: RECEIVED A CALL FROM PT. RETURNING PHONE CALL. PT WAS INFORMED PREVIOUS MESSAGE AND WILL DROP OFF SPECIMEN AS SCHEDULED TOMORROW. THANK YOU.

## 2024-06-20 NOTE — TELEPHONE ENCOUNTER
Tried to call pt back regarding appt tomorrow. He does not have to see   Erwin if he doesn't want to, we just need him to bring his specimen to the office as a drop off so Dr Hood can examine it for live sperm. It is much better if Dr Hood looks at it than pt going to lab. We leave appt on but he will not actually meet face to face with Erwin. We will call him with result after Erwin looks at the specimen.    No voicemail box set up.    Hub/answering service okay to relay above information to pt if he calls back

## 2024-06-20 NOTE — TELEPHONE ENCOUNTER
Provider: DR GARCIA    Caller: FELISA SANCHEZ    Relationship to Patient: SELF      Reason for Call: PT CALLED TO CONFIRM IF APPT IS NEEDED FOR 6/21/24.    PT IS NOT HAVING ANY COMPLICATIONS - AND WOULD LIKE TO CANCEL THE APPT AND JUST DROP OF SEMEN SAMPLE.    PLEASE PLACE ORDERS FOR SEMEN ANALYSIS, AND CALL PT TO CONFIRM IF OK TO CANCEL APPT ON 6/21/24.

## 2024-06-21 ENCOUNTER — OFFICE VISIT (OUTPATIENT)
Dept: UROLOGY | Facility: CLINIC | Age: 49
End: 2024-06-21
Payer: COMMERCIAL

## 2024-06-21 DIAGNOSIS — Z30.2 STERILIZATION: Primary | ICD-10-CM

## 2024-06-21 PROCEDURE — 99024 POSTOP FOLLOW-UP VISIT: CPT | Performed by: UROLOGY

## 2024-06-21 NOTE — PROGRESS NOTES
Subjective         History of Present Illness:  Freddy Colon is a 49 y.o. male who is here status post vasectomy. 0 sperm noted on a #20 power fields.         Assessment and Plan     Undesired fertility    Medications  Medications have been reconciled.       Instructions    [x]   Instructed patient to follow-up as needed, counseled that he is okay to stop using birth control.    []   Patient to follow-up in 1 month for recheck, patient counseled to continue use birth control as he is still considered fertile and able to father children until recheck and given the okay to stop using birth control at that time. Patient voiced understanding.         Fermín Hood MD

## 2024-09-18 ENCOUNTER — OFFICE VISIT (OUTPATIENT)
Dept: FAMILY MEDICINE CLINIC | Facility: CLINIC | Age: 49
End: 2024-09-18
Payer: COMMERCIAL

## 2024-09-18 VITALS
SYSTOLIC BLOOD PRESSURE: 128 MMHG | TEMPERATURE: 99.5 F | DIASTOLIC BLOOD PRESSURE: 68 MMHG | HEART RATE: 83 BPM | HEIGHT: 69 IN | BODY MASS INDEX: 25.52 KG/M2 | WEIGHT: 172.3 LBS | OXYGEN SATURATION: 100 %

## 2024-09-18 DIAGNOSIS — L08.9 INFECTED SEBACEOUS CYST: ICD-10-CM

## 2024-09-18 DIAGNOSIS — Z12.11 SCREENING FOR COLON CANCER: ICD-10-CM

## 2024-09-18 DIAGNOSIS — L72.3 SEBACEOUS CYST: Primary | ICD-10-CM

## 2024-09-18 DIAGNOSIS — L72.3 INFECTED SEBACEOUS CYST: ICD-10-CM

## 2024-09-18 PROCEDURE — 99213 OFFICE O/P EST LOW 20 MIN: CPT

## 2024-09-18 RX ORDER — SULFAMETHOXAZOLE/TRIMETHOPRIM 800-160 MG
1 TABLET ORAL 2 TIMES DAILY
Qty: 14 TABLET | Refills: 0 | Status: SHIPPED | OUTPATIENT
Start: 2024-09-18

## 2024-09-19 ENCOUNTER — OFFICE VISIT (OUTPATIENT)
Dept: SURGERY | Facility: CLINIC | Age: 49
End: 2024-09-19
Payer: COMMERCIAL

## 2024-09-19 VITALS
HEART RATE: 80 BPM | BODY MASS INDEX: 25.18 KG/M2 | HEIGHT: 69 IN | WEIGHT: 170 LBS | SYSTOLIC BLOOD PRESSURE: 126 MMHG | DIASTOLIC BLOOD PRESSURE: 77 MMHG

## 2024-09-19 DIAGNOSIS — L08.9 INFECTED SEBACEOUS CYST: Primary | ICD-10-CM

## 2024-09-19 DIAGNOSIS — Z53.21 PATIENT LEFT WITHOUT BEING SEEN: ICD-10-CM

## 2024-09-19 DIAGNOSIS — L72.3 INFECTED SEBACEOUS CYST: Primary | ICD-10-CM

## 2024-09-20 ENCOUNTER — PROCEDURE VISIT (OUTPATIENT)
Dept: SURGERY | Facility: CLINIC | Age: 49
End: 2024-09-20
Payer: COMMERCIAL

## 2024-09-20 VITALS
HEIGHT: 69 IN | HEART RATE: 73 BPM | WEIGHT: 170 LBS | BODY MASS INDEX: 25.18 KG/M2 | SYSTOLIC BLOOD PRESSURE: 130 MMHG | DIASTOLIC BLOOD PRESSURE: 71 MMHG

## 2024-09-20 DIAGNOSIS — L72.3 INFECTED SEBACEOUS CYST: Primary | ICD-10-CM

## 2024-09-20 DIAGNOSIS — L08.9 INFECTED SEBACEOUS CYST: Primary | ICD-10-CM

## 2024-09-20 PROCEDURE — 87015 SPECIMEN INFECT AGNT CONCNTJ: CPT | Performed by: SURGERY

## 2024-09-20 PROCEDURE — 87070 CULTURE OTHR SPECIMN AEROBIC: CPT | Performed by: SURGERY

## 2024-09-20 PROCEDURE — 87205 SMEAR GRAM STAIN: CPT | Performed by: SURGERY

## 2024-09-20 PROCEDURE — 87075 CULTR BACTERIA EXCEPT BLOOD: CPT | Performed by: SURGERY

## 2024-09-23 LAB
BACTERIA SPEC AEROBE CULT: NORMAL
GRAM STN SPEC: NORMAL

## 2024-09-25 LAB — BACTERIA SPEC ANAEROBE CULT: ABNORMAL

## 2024-11-08 ENCOUNTER — LAB (OUTPATIENT)
Dept: LAB | Facility: HOSPITAL | Age: 49
End: 2024-11-08
Payer: COMMERCIAL

## 2024-11-08 DIAGNOSIS — E78.00 HIGH CHOLESTEROL: Chronic | ICD-10-CM

## 2024-11-08 LAB
CHOLEST SERPL-MCNC: 255 MG/DL (ref 0–200)
HDLC SERPL-MCNC: 51 MG/DL (ref 40–60)
LDLC SERPL CALC-MCNC: 185 MG/DL (ref 0–100)
LDLC/HDLC SERPL: 3.58 {RATIO}
TRIGL SERPL-MCNC: 106 MG/DL (ref 0–150)
VLDLC SERPL-MCNC: 19 MG/DL (ref 5–40)

## 2024-11-08 PROCEDURE — 36415 COLL VENOUS BLD VENIPUNCTURE: CPT

## 2024-11-08 PROCEDURE — 80061 LIPID PANEL: CPT

## 2024-11-12 ENCOUNTER — LAB (OUTPATIENT)
Dept: LAB | Facility: HOSPITAL | Age: 49
End: 2024-11-12
Payer: COMMERCIAL

## 2024-11-12 ENCOUNTER — OFFICE VISIT (OUTPATIENT)
Dept: FAMILY MEDICINE CLINIC | Facility: CLINIC | Age: 49
End: 2024-11-12
Payer: COMMERCIAL

## 2024-11-12 VITALS
HEART RATE: 80 BPM | OXYGEN SATURATION: 99 % | HEIGHT: 69 IN | TEMPERATURE: 98.2 F | DIASTOLIC BLOOD PRESSURE: 80 MMHG | SYSTOLIC BLOOD PRESSURE: 141 MMHG | WEIGHT: 171.8 LBS | BODY MASS INDEX: 25.45 KG/M2 | RESPIRATION RATE: 18 BRPM

## 2024-11-12 DIAGNOSIS — E78.00 HIGH CHOLESTEROL: Primary | Chronic | ICD-10-CM

## 2024-11-12 DIAGNOSIS — E78.00 HIGH CHOLESTEROL: Chronic | ICD-10-CM

## 2024-11-12 DIAGNOSIS — R74.01 ELEVATED ALT MEASUREMENT: ICD-10-CM

## 2024-11-12 DIAGNOSIS — I25.10 CORONARY ARTERY DISEASE INVOLVING NATIVE CORONARY ARTERY OF NATIVE HEART WITHOUT ANGINA PECTORIS: Chronic | ICD-10-CM

## 2024-11-12 LAB
ALBUMIN SERPL-MCNC: 4.3 G/DL (ref 3.5–5.2)
ALBUMIN/GLOB SERPL: 1.5 G/DL
ALP SERPL-CCNC: 49 U/L (ref 39–117)
ALT SERPL W P-5'-P-CCNC: 34 U/L (ref 1–41)
ANION GAP SERPL CALCULATED.3IONS-SCNC: 10.1 MMOL/L (ref 5–15)
AST SERPL-CCNC: 26 U/L (ref 1–40)
BILIRUB SERPL-MCNC: 0.3 MG/DL (ref 0–1.2)
BUN SERPL-MCNC: 22 MG/DL (ref 6–20)
BUN/CREAT SERPL: 20 (ref 7–25)
CALCIUM SPEC-SCNC: 9.8 MG/DL (ref 8.6–10.5)
CHLORIDE SERPL-SCNC: 101 MMOL/L (ref 98–107)
CO2 SERPL-SCNC: 25.9 MMOL/L (ref 22–29)
CREAT SERPL-MCNC: 1.1 MG/DL (ref 0.76–1.27)
EGFRCR SERPLBLD CKD-EPI 2021: 83.3 ML/MIN/1.73
GLOBULIN UR ELPH-MCNC: 2.8 GM/DL
GLUCOSE SERPL-MCNC: 88 MG/DL (ref 65–99)
POTASSIUM SERPL-SCNC: 4.5 MMOL/L (ref 3.5–5.2)
PROT SERPL-MCNC: 7.1 G/DL (ref 6–8.5)
SODIUM SERPL-SCNC: 137 MMOL/L (ref 136–145)

## 2024-11-12 PROCEDURE — 86381 MITOCHONDRIAL ANTIBODY EACH: CPT

## 2024-11-12 PROCEDURE — 36415 COLL VENOUS BLD VENIPUNCTURE: CPT

## 2024-11-12 PROCEDURE — 99214 OFFICE O/P EST MOD 30 MIN: CPT | Performed by: FAMILY MEDICINE

## 2024-11-12 PROCEDURE — 82172 ASSAY OF APOLIPOPROTEIN: CPT

## 2024-11-12 PROCEDURE — 80053 COMPREHEN METABOLIC PANEL: CPT

## 2024-11-12 NOTE — PROGRESS NOTES
"Chief Complaint  Medication Problem (Questions/discussion about Rousvastatin)    Subjective        Freddy Colon presents to [facility]  History of Present Illness  Freddy presents to the clinic for management of his chronic medical conditions. He has a FH of CAD. He is single and has one son. He is an engeer. Up until March 23, 2023 the patient was pretty much without any past medical history other than HLD.  Subsequently at that point he suffered an NSTEMI.  Now he is diagnosed with coronary artery disease status post stent to the RCA and hyperlipidemia.     He does have occasional chest discomfort but says he believes it is due to dyspepsia and says usually it is resolved by burping.     The patient has no other complaints today and denies chest pain, shortness of breath, weakness, numbness, nausea, vomiting, diarrhea, dizziness or syncopal event.        Objective   Vital Signs:  /80 (BP Location: Left arm, Patient Position: Sitting, Cuff Size: Adult)   Pulse 80   Temp 98.2 °F (36.8 °C) (Temporal)   Resp 18   Ht 175.3 cm (69.02\")   Wt 77.9 kg (171 lb 12.8 oz)   SpO2 99%   BMI 25.36 kg/m²   Estimated body mass index is 25.36 kg/m² as calculated from the following:    Height as of this encounter: 175.3 cm (69.02\").    Weight as of this encounter: 77.9 kg (171 lb 12.8 oz).            Physical Exam  Vitals reviewed.   Constitutional:       Appearance: He is well-developed and overweight.   HENT:      Head: Normocephalic and atraumatic.      Right Ear: External ear normal.      Left Ear: External ear normal.      Mouth/Throat:      Pharynx: No oropharyngeal exudate.   Eyes:      Conjunctiva/sclera: Conjunctivae normal.      Pupils: Pupils are equal, round, and reactive to light.   Neck:      Vascular: No carotid bruit.   Cardiovascular:      Rate and Rhythm: Normal rate and regular rhythm.      Heart sounds: No murmur heard.     No friction rub. No gallop.   Pulmonary:     "  Effort: Pulmonary effort is normal.      Breath sounds: Normal breath sounds. No wheezing or rhonchi.   Abdominal:      General: There is no distension.   Skin:     General: Skin is warm and dry.   Neurological:      Mental Status: He is alert and oriented to person, place, and time.      Cranial Nerves: No cranial nerve deficit.      Motor: No weakness.   Psychiatric:         Mood and Affect: Mood and affect normal.         Behavior: Behavior normal.         Thought Content: Thought content normal.         Judgment: Judgment normal.        Result Review :    CMP          5/10/2024    08:13 11/12/2024    18:08   CMP   Glucose 98  88    BUN 20  22    Creatinine 1.05  1.10    EGFR 88.1  83.3    Sodium 139  137    Potassium 5.1  4.5    Chloride 101  101    Calcium 10.1  9.8    Total Protein 7.0  7.1    Albumin 4.6  4.3    Globulin 2.4  2.8    Total Bilirubin 0.5  0.3    Alkaline Phosphatase 52  49    AST (SGOT) 37  26    ALT (SGPT) 58  34    Albumin/Globulin Ratio 1.9  1.5    BUN/Creatinine Ratio 19.0  20.0    Anion Gap 11.7  10.1        Lipid Panel          5/10/2024    08:13 11/8/2024    08:20   Lipid Panel   Total Cholesterol 192  255    Triglycerides 102  106    HDL Cholesterol 47  51    VLDL Cholesterol 18  19    LDL Cholesterol  127  185    LDL/HDL Ratio 2.65  3.58                  Assessment and Plan   Diagnoses and all orders for this visit:    1. High cholesterol (Primary)  Assessment & Plan:   Lipid abnormalities are worsening    Plan:  He stopped his crestor due to fear of liver enzyme elevation. .      Counseled patient on lifestyle modifications to help control hyperlipidemia.   Cholesterol lowering dietary information shared with patient.  Weight Loss encouraged    Patient Treatment Goals:   LDL goal is less than 70  LDL goal is under 100    Followup in 6 months.    Orders:  -     Comprehensive metabolic panel; Future  -     Apolipoprotein B; Future      2. Elevated ALT measurement   Assessment &  Plan:  The patient is ALT has inched upwards to 46 now.  He was in the 20s back before he started Crestor.  The literature says if his lipid levels will stay under 3 times normal then we can just follow and observe.  I encouraged him to talk to his cardiologist at his follow-up appointment in January about his elevated liver enzymes.  If they continue to creep upwards then he may be a candidate for Repatha.            Orders:            -     Mitochondrial Antibodies, M2; Future    3. Coronary artery disease involving native coronary artery of native heart without angina pectoris.             - The patient's cholesterol is crept up.  Low fat diet was reinforced today.  We did discuss him starting back on his statin. He is to work on his diet and we will going repeated in 6 months.          Follow Up   Return in about 6 months (around 5/12/2025).  Patient was given instructions and counseling regarding his condition or for health maintenance advice. Please see specific information pulled into the AVS if appropriate.

## 2024-11-13 PROBLEM — Z30.2 ENCOUNTER FOR STERILIZATION: Status: RESOLVED | Noted: 2023-11-07 | Resolved: 2024-11-13

## 2024-11-13 PROBLEM — A63.0 CONDYLOMA: Status: RESOLVED | Noted: 2023-11-07 | Resolved: 2024-11-13

## 2024-11-13 LAB — MITOCHONDRIA M2 IGG SER-ACNC: 52.6 UNITS (ref 0–20)

## 2024-11-14 LAB — APO B SERPL-MCNC: 159 MG/DL

## 2024-11-14 NOTE — ASSESSMENT & PLAN NOTE
The patient's cholesterol is crept up.  Low fat diet was reinforced today.  We did discuss him starting back on his statin. He is to work on his diet and we will going repeated in 6 months.

## 2024-11-14 NOTE — ASSESSMENT & PLAN NOTE
Lipid abnormalities are worsening    Plan:  He stopped his crestor due to fear of liver enzyme elevation. .      Counseled patient on lifestyle modifications to help control hyperlipidemia.   Cholesterol lowering dietary information shared with patient.  Weight Loss encouraged    Patient Treatment Goals:   LDL goal is less than 70  LDL goal is under 100    Followup in 6 months.

## 2024-11-15 ENCOUNTER — TELEPHONE (OUTPATIENT)
Dept: FAMILY MEDICINE CLINIC | Facility: CLINIC | Age: 49
End: 2024-11-15
Payer: COMMERCIAL

## 2024-11-15 NOTE — TELEPHONE ENCOUNTER
Patient wanted to let you know he is taking 10mg of his crestor instead of 20mg.     Patient states he has been getting a tattoo removed and was wondering if that could have an effect on his blood work? He wanted to make us aware in case it was to impact his labs.

## 2024-11-15 NOTE — TELEPHONE ENCOUNTER
Caller: Freddy Colon    Relationship to patient: Self    Best call back number: [phone]     Patient is needing: PATIENT REQUESTING A CALL BACK FROM DR. PALACIO. PATIENT STATES IT IS REGARDING THE APPOINTMENT HE HAD EARLIER THIS WEEK. PATIENT HAS DISCOVERED FURTHER INFORMATION. PLEASE ADVISE.

## 2024-11-20 DIAGNOSIS — R74.01 ELEVATED ALT MEASUREMENT: Primary | ICD-10-CM

## 2024-11-20 RX ORDER — ROSUVASTATIN CALCIUM 10 MG/1
10 TABLET, COATED ORAL DAILY
Start: 2024-11-20

## 2024-11-20 NOTE — TELEPHONE ENCOUNTER
I will update his med list. Tatoo removal could cause his WBC's to change some but, as long as the area does not become infected they will return to baseline.

## 2024-11-20 NOTE — TELEPHONE ENCOUNTER
Spoke with patient, he is aware of Dr. Siegel's response.  He also sent separate my chart message to us, I routed that to provider again today.

## 2025-08-26 ENCOUNTER — OFFICE VISIT (OUTPATIENT)
Dept: FAMILY MEDICINE CLINIC | Facility: CLINIC | Age: 50
End: 2025-08-26
Payer: COMMERCIAL

## 2025-08-26 VITALS
RESPIRATION RATE: 17 BRPM | DIASTOLIC BLOOD PRESSURE: 90 MMHG | WEIGHT: 170.4 LBS | HEIGHT: 69 IN | BODY MASS INDEX: 25.24 KG/M2 | OXYGEN SATURATION: 96 % | HEART RATE: 71 BPM | TEMPERATURE: 98.6 F | SYSTOLIC BLOOD PRESSURE: 136 MMHG

## 2025-08-26 DIAGNOSIS — E78.00 HIGH CHOLESTEROL: Chronic | ICD-10-CM

## 2025-08-26 DIAGNOSIS — E66.3 OVERWEIGHT WITH BODY MASS INDEX (BMI) OF 25 TO 25.9 IN ADULT: ICD-10-CM

## 2025-08-26 DIAGNOSIS — I25.10 CORONARY ARTERY DISEASE INVOLVING NATIVE CORONARY ARTERY OF NATIVE HEART WITHOUT ANGINA PECTORIS: Chronic | ICD-10-CM

## 2025-08-26 DIAGNOSIS — Z00.01 ENCOUNTER FOR GENERAL ADULT MEDICAL EXAMINATION WITH ABNORMAL FINDINGS: Primary | ICD-10-CM

## 2025-08-26 RX ORDER — ROSUVASTATIN CALCIUM 10 MG/1
10 TABLET, COATED ORAL DAILY
Qty: 90 TABLET | Refills: 0 | Status: CANCELLED | OUTPATIENT
Start: 2025-08-26

## 2025-08-26 RX ORDER — ROSUVASTATIN CALCIUM 10 MG/1
10 TABLET, COATED ORAL DAILY
Qty: 90 TABLET | Refills: 3 | Status: SHIPPED | OUTPATIENT
Start: 2025-08-26

## (undated) DEVICE — CATH LAB PACK: Brand: MEDLINE INDUSTRIES, INC.

## (undated) DEVICE — RADIFOCUS OPTITORQUE ANGIOGRAPHIC CATHETER: Brand: OPTITORQUE

## (undated) DEVICE — GLIDESHEATH SLENDER STAINLESS STEEL KIT: Brand: GLIDESHEATH SLENDER

## (undated) DEVICE — CATH IMG IVUS EAGLE EYE PLATIN RX DIGITAL .014IN 5FR

## (undated) DEVICE — 6F .070 JR 4 100CM: Brand: CORDIS

## (undated) DEVICE — HI-TORQUE BALANCE MIDDLEWEIGHT UNIVERSAL GUIDE WIRE .014 STRAIGHT TIP 3.0 CM X 190 CM: Brand: HI-TORQUE BALANCE MIDDLEWEIGHT UNIVERSAL

## (undated) DEVICE — NC TREK NEO™ CORONARY DILATATION CATHETER 3.75 MM X 15 MM / RAPID-EXCHANGE: Brand: NC TREK NEO™

## (undated) DEVICE — CATH BALN CORNRY SAPPHIRE2 PRO 1X10MM

## (undated) DEVICE — NC TREK NEO™ CORONARY DILATATION CATHETER 2.50 X 20 MM / RAPID-EXCHANGE: Brand: NC TREK NEO™

## (undated) DEVICE — GW FC FLOP/TP .035 260CM 3MM

## (undated) DEVICE — NC TREK NEO™ CORONARY DILATATION CATHETER 3.50 MM X 20 MM / RAPID-EXCHANGE: Brand: NC TREK NEO™